# Patient Record
Sex: FEMALE | Race: OTHER | Employment: FULL TIME | URBAN - METROPOLITAN AREA
[De-identification: names, ages, dates, MRNs, and addresses within clinical notes are randomized per-mention and may not be internally consistent; named-entity substitution may affect disease eponyms.]

---

## 2017-04-24 PROBLEM — Z85.3 HISTORY OF BREAST CANCER: Status: ACTIVE | Noted: 2017-04-24

## 2017-04-25 PROBLEM — D50.8 IRON DEFICIENCY ANEMIA SECONDARY TO INADEQUATE DIETARY IRON INTAKE: Status: RESOLVED | Noted: 2017-04-25 | Resolved: 2017-04-25

## 2017-04-25 PROBLEM — D50.8 IRON DEFICIENCY ANEMIA SECONDARY TO INADEQUATE DIETARY IRON INTAKE: Status: ACTIVE | Noted: 2017-04-25

## 2017-04-25 PROBLEM — D64.9 ANEMIA: Status: ACTIVE | Noted: 2017-04-25

## 2017-04-28 ENCOUNTER — HOSPITAL ENCOUNTER (OUTPATIENT)
Dept: CT IMAGING | Age: 42
Discharge: HOME | End: 2017-04-28
Attending: FAMILY MEDICINE
Payer: COMMERCIAL

## 2017-04-28 DIAGNOSIS — G43.909 MIGRAINE WITHOUT STATUS MIGRAINOSUS, NOT INTRACTABLE, UNSPECIFIED MIGRAINE TYPE: ICD-10-CM

## 2017-04-28 DIAGNOSIS — R55 SYNCOPE, UNSPECIFIED SYNCOPE TYPE: ICD-10-CM

## 2017-04-28 PROCEDURE — 70450 CT HEAD/BRAIN W/O DYE: CPT

## 2018-08-31 ENCOUNTER — HOSPITAL ENCOUNTER (OUTPATIENT)
Dept: OCCUPATIONAL MEDICINE | Age: 43
Discharge: HOME | End: 2018-08-31

## 2018-08-31 DIAGNOSIS — Z00.8 HEALTH EXAMINATION IN POPULATION SURVEY: ICD-10-CM

## 2019-03-30 ENCOUNTER — HOSPITAL ENCOUNTER (OUTPATIENT)
Dept: MAMMOGRAPHY | Age: 44
Discharge: HOME | End: 2019-03-30
Attending: OBSTETRICS & GYNECOLOGY
Payer: COMMERCIAL

## 2019-03-30 DIAGNOSIS — Z12.31 VISIT FOR SCREENING MAMMOGRAM: ICD-10-CM

## 2019-03-30 PROCEDURE — 77067 SCR MAMMO BI INCL CAD: CPT

## 2019-05-08 ENCOUNTER — HOSPITAL ENCOUNTER (OUTPATIENT)
Dept: MRI IMAGING | Age: 44
Discharge: HOME | End: 2019-05-08
Attending: OBSTETRICS & GYNECOLOGY
Payer: COMMERCIAL

## 2019-05-08 DIAGNOSIS — R92.2 DENSE BREAST TISSUE: ICD-10-CM

## 2019-05-08 DIAGNOSIS — Z85.3 HISTORY OF BREAST CANCER: ICD-10-CM

## 2019-05-08 PROCEDURE — A9575 INJ GADOTERATE MEGLUMI 0.1ML: HCPCS | Performed by: OBSTETRICS & GYNECOLOGY

## 2019-05-08 PROCEDURE — 77049 MRI BREAST C-+ W/CAD BI: CPT

## 2019-05-08 PROCEDURE — 74011250636 HC RX REV CODE- 250/636: Performed by: OBSTETRICS & GYNECOLOGY

## 2019-05-08 PROCEDURE — 74011000258 HC RX REV CODE- 258: Performed by: OBSTETRICS & GYNECOLOGY

## 2019-05-08 RX ORDER — GADOTERATE MEGLUMINE 376.9 MG/ML
10 INJECTION INTRAVENOUS
Status: COMPLETED | OUTPATIENT
Start: 2019-05-08 | End: 2019-05-08

## 2019-05-08 RX ORDER — SODIUM CHLORIDE 0.9 % (FLUSH) 0.9 %
10 SYRINGE (ML) INJECTION
Status: COMPLETED | OUTPATIENT
Start: 2019-05-08 | End: 2019-05-08

## 2019-05-08 RX ADMIN — Medication 10 ML: at 16:29

## 2019-05-08 RX ADMIN — GADOTERATE MEGLUMINE 10 ML: 376.9 INJECTION INTRAVENOUS at 16:29

## 2019-05-08 RX ADMIN — SODIUM CHLORIDE 100 ML: 900 INJECTION, SOLUTION INTRAVENOUS at 16:29

## 2019-07-30 PROBLEM — E55.9 VITAMIN D DEFICIENCY: Status: ACTIVE | Noted: 2019-07-30

## 2019-07-30 PROBLEM — E53.8 B12 DEFICIENCY: Status: ACTIVE | Noted: 2019-07-30

## 2019-08-15 ENCOUNTER — HOSPITAL ENCOUNTER (OUTPATIENT)
Dept: LAB | Age: 44
Discharge: HOME | End: 2019-08-15
Payer: COMMERCIAL

## 2019-08-15 DIAGNOSIS — D64.9 ANEMIA, UNSPECIFIED TYPE: ICD-10-CM

## 2019-08-15 LAB
ALBUMIN SERPL-MCNC: 4.2 G/DL (ref 3.5–5)
ALBUMIN/GLOB SERPL: 1 {RATIO} (ref 1.2–3.5)
ALP SERPL-CCNC: 49 U/L (ref 50–136)
ALT SERPL-CCNC: 21 U/L (ref 12–65)
ANION GAP SERPL CALC-SCNC: 4 MMOL/L (ref 7–16)
AST SERPL-CCNC: 11 U/L (ref 15–37)
BASOPHILS # BLD: 0.1 K/UL (ref 0–0.2)
BASOPHILS NFR BLD: 1 % (ref 0–2)
BILIRUB SERPL-MCNC: 0.5 MG/DL (ref 0.2–1.1)
BUN SERPL-MCNC: 9 MG/DL (ref 6–23)
CALCIUM SERPL-MCNC: 9.2 MG/DL (ref 8.3–10.4)
CHLORIDE SERPL-SCNC: 109 MMOL/L (ref 98–107)
CO2 SERPL-SCNC: 28 MMOL/L (ref 21–32)
CREAT SERPL-MCNC: 0.84 MG/DL (ref 0.6–1)
CRP SERPL-MCNC: <0.3 MG/DL (ref 0–0.9)
DIFFERENTIAL METHOD BLD: ABNORMAL
EOSINOPHIL # BLD: 0.1 K/UL (ref 0–0.8)
EOSINOPHIL NFR BLD: 1 % (ref 0.5–7.8)
ERYTHROCYTE [DISTWIDTH] IN BLOOD BY AUTOMATED COUNT: 34.1 % (ref 11.9–14.6)
ERYTHROCYTE [SEDIMENTATION RATE] IN BLOOD: 13 MM/HR (ref 0–20)
FERRITIN SERPL-MCNC: 16 NG/ML (ref 8–388)
FOLATE SERPL-MCNC: 18.9 NG/ML (ref 3.1–17.5)
GLOBULIN SER CALC-MCNC: 4.1 G/DL (ref 2.3–3.5)
GLUCOSE SERPL-MCNC: 69 MG/DL (ref 65–100)
HAPTOGLOB SERPL-MCNC: 124 MG/DL (ref 30–200)
HCT VFR BLD AUTO: 34.9 % (ref 35.8–46.3)
HGB BLD-MCNC: 9.2 G/DL (ref 11.7–15.4)
HGB RETIC QN AUTO: 26 PG (ref 29–35)
IMM GRANULOCYTES # BLD AUTO: 0 K/UL (ref 0–0.5)
IMM GRANULOCYTES NFR BLD AUTO: 0 % (ref 0–5)
IMM RETICS NFR: 22 % (ref 3–15.9)
IRON SATN MFR SERPL: 5 %
IRON SERPL-MCNC: 23 UG/DL (ref 35–150)
LDH SERPL L TO P-CCNC: 129 U/L (ref 100–190)
LYMPHOCYTES # BLD: 1.1 K/UL (ref 0.5–4.6)
LYMPHOCYTES NFR BLD: 22 % (ref 13–44)
MCH RBC QN AUTO: 19.3 PG (ref 26.1–32.9)
MCHC RBC AUTO-ENTMCNC: 26.4 G/DL (ref 31.4–35)
MCV RBC AUTO: 73.2 FL (ref 79.6–97.8)
MONOCYTES # BLD: 0.4 K/UL (ref 0.1–1.3)
MONOCYTES NFR BLD: 8 % (ref 4–12)
NEUTS SEG # BLD: 3.3 K/UL (ref 1.7–8.2)
NEUTS SEG NFR BLD: 68 % (ref 43–78)
NRBC # BLD: 0 K/UL (ref 0–0.2)
PLATELET # BLD AUTO: 311 K/UL (ref 150–450)
PLATELET COMMENTS,PCOM: ADEQUATE
PMV BLD AUTO: ABNORMAL FL (ref 9.4–12.3)
POTASSIUM SERPL-SCNC: 4.3 MMOL/L (ref 3.5–5.1)
PROT SERPL-MCNC: 8.3 G/DL (ref 6.3–8.2)
RBC # BLD AUTO: 4.77 M/UL (ref 4.05–5.25)
RBC MORPH BLD: ABNORMAL
RETICS # AUTO: 0.16 M/UL (ref 0.03–0.1)
RETICS/RBC NFR AUTO: 3.3 % (ref 0.3–2)
SODIUM SERPL-SCNC: 141 MMOL/L (ref 136–145)
TIBC SERPL-MCNC: 435 UG/DL (ref 250–450)
VIT B12 SERPL-MCNC: 261 PG/ML (ref 193–986)
WBC # BLD AUTO: 5 K/UL (ref 4.3–11.1)
WBC MORPH BLD: ABNORMAL

## 2019-08-15 PROCEDURE — 85025 COMPLETE CBC W/AUTO DIFF WBC: CPT

## 2019-08-15 PROCEDURE — 83010 ASSAY OF HAPTOGLOBIN QUANT: CPT

## 2019-08-15 PROCEDURE — 85046 RETICYTE/HGB CONCENTRATE: CPT

## 2019-08-15 PROCEDURE — 83615 LACTATE (LD) (LDH) ENZYME: CPT

## 2019-08-15 PROCEDURE — 82607 VITAMIN B-12: CPT

## 2019-08-15 PROCEDURE — 80053 COMPREHEN METABOLIC PANEL: CPT

## 2019-08-15 PROCEDURE — 82728 ASSAY OF FERRITIN: CPT

## 2019-08-15 PROCEDURE — 83540 ASSAY OF IRON: CPT

## 2019-08-15 PROCEDURE — 85652 RBC SED RATE AUTOMATED: CPT

## 2019-08-15 PROCEDURE — 82746 ASSAY OF FOLIC ACID SERUM: CPT

## 2019-08-15 PROCEDURE — 86140 C-REACTIVE PROTEIN: CPT

## 2019-08-16 LAB — PATH REV BLD -IMP: NORMAL

## 2020-05-26 PROBLEM — N92.0 MENORRHAGIA WITH REGULAR CYCLE: Status: ACTIVE | Noted: 2020-05-26

## 2020-06-08 ENCOUNTER — HOSPITAL ENCOUNTER (OUTPATIENT)
Dept: LAB | Age: 45
Discharge: HOME | End: 2020-06-08
Payer: COMMERCIAL

## 2020-06-08 DIAGNOSIS — E53.8 B12 DEFICIENCY: ICD-10-CM

## 2020-06-08 DIAGNOSIS — D64.9 ANEMIA, UNSPECIFIED TYPE: ICD-10-CM

## 2020-06-08 LAB
ALBUMIN SERPL-MCNC: 4.1 G/DL (ref 3.5–5)
ALBUMIN/GLOB SERPL: 1.1 {RATIO} (ref 1.2–3.5)
ALP SERPL-CCNC: 59 U/L (ref 50–136)
ALT SERPL-CCNC: 18 U/L (ref 12–65)
ANION GAP SERPL CALC-SCNC: 5 MMOL/L (ref 7–16)
AST SERPL-CCNC: 7 U/L (ref 15–37)
BASOPHILS # BLD: 0.1 K/UL (ref 0–0.2)
BASOPHILS NFR BLD: 1 % (ref 0–2)
BILIRUB SERPL-MCNC: 0.2 MG/DL (ref 0.2–1.1)
BUN SERPL-MCNC: 13 MG/DL (ref 6–23)
CALCIUM SERPL-MCNC: 8.5 MG/DL (ref 8.3–10.4)
CHLORIDE SERPL-SCNC: 108 MMOL/L (ref 98–107)
CO2 SERPL-SCNC: 24 MMOL/L (ref 21–32)
CREAT SERPL-MCNC: 1.1 MG/DL (ref 0.6–1)
DIFFERENTIAL METHOD BLD: ABNORMAL
EOSINOPHIL # BLD: 0.1 K/UL (ref 0–0.8)
EOSINOPHIL NFR BLD: 1 % (ref 0.5–7.8)
ERYTHROCYTE [DISTWIDTH] IN BLOOD BY AUTOMATED COUNT: 18.6 % (ref 11.9–14.6)
FERRITIN SERPL-MCNC: 3 NG/ML (ref 8–388)
GLOBULIN SER CALC-MCNC: 3.9 G/DL (ref 2.3–3.5)
GLUCOSE SERPL-MCNC: 114 MG/DL (ref 65–100)
HCT VFR BLD AUTO: 27.2 % (ref 35.8–46.3)
HGB BLD-MCNC: 7.6 G/DL (ref 11.7–15.4)
HGB RETIC QN AUTO: 17 PG (ref 29–35)
IMM GRANULOCYTES # BLD AUTO: 0.1 K/UL (ref 0–0.5)
IMM GRANULOCYTES NFR BLD AUTO: 1 % (ref 0–5)
IMM RETICS NFR: 15.6 % (ref 3–15.9)
IRON SATN MFR SERPL: 2 %
IRON SERPL-MCNC: 9 UG/DL (ref 35–150)
LYMPHOCYTES # BLD: 1.5 K/UL (ref 0.5–4.6)
LYMPHOCYTES NFR BLD: 26 % (ref 13–44)
MCH RBC QN AUTO: 19.3 PG (ref 26.1–32.9)
MCHC RBC AUTO-ENTMCNC: 27.9 G/DL (ref 31.4–35)
MCV RBC AUTO: 69 FL (ref 79.6–97.8)
MONOCYTES # BLD: 0.3 K/UL (ref 0.1–1.3)
MONOCYTES NFR BLD: 6 % (ref 4–12)
NEUTS SEG # BLD: 3.5 K/UL (ref 1.7–8.2)
NEUTS SEG NFR BLD: 65 % (ref 43–78)
NRBC # BLD: 0 K/UL (ref 0–0.2)
PLATELET # BLD AUTO: 361 K/UL (ref 150–450)
PLATELET COMMENTS,PCOM: ADEQUATE
PMV BLD AUTO: 11.8 FL (ref 9.4–12.3)
POTASSIUM SERPL-SCNC: 3.9 MMOL/L (ref 3.5–5.1)
PROT SERPL-MCNC: 8 G/DL (ref 6.3–8.2)
RBC # BLD AUTO: 3.94 M/UL (ref 4.05–5.25)
RBC MORPH BLD: ABNORMAL
RETICS # AUTO: 0.05 M/UL (ref 0.03–0.1)
RETICS/RBC NFR AUTO: 1.3 % (ref 0.3–2)
SODIUM SERPL-SCNC: 137 MMOL/L (ref 136–145)
TIBC SERPL-MCNC: 430 UG/DL (ref 250–450)
VIT B12 SERPL-MCNC: 225 PG/ML (ref 193–986)
WBC # BLD AUTO: 5.6 K/UL (ref 4.3–11.1)
WBC MORPH BLD: ABNORMAL

## 2020-06-08 PROCEDURE — 82607 VITAMIN B-12: CPT

## 2020-06-08 PROCEDURE — 80053 COMPREHEN METABOLIC PANEL: CPT

## 2020-06-08 PROCEDURE — 83540 ASSAY OF IRON: CPT

## 2020-06-08 PROCEDURE — 85046 RETICYTE/HGB CONCENTRATE: CPT

## 2020-06-08 PROCEDURE — 82728 ASSAY OF FERRITIN: CPT

## 2020-06-08 PROCEDURE — 36415 COLL VENOUS BLD VENIPUNCTURE: CPT

## 2020-06-08 PROCEDURE — 85025 COMPLETE CBC W/AUTO DIFF WBC: CPT

## 2020-06-10 PROBLEM — R53.83 FATIGUE: Status: ACTIVE | Noted: 2020-06-10

## 2020-06-10 NOTE — H&P
Gynecology Major Surgery History and Physical    Denilson Orosco  [id number]    Subjective:      Chief complaint:  abnormal uterine bleeding    Pili is a 54 y.o.  female with a history of abnormal uterine bleeding, acute blood loss anemia, fibroids and menorrhagia. Previous treatment measures include hormone therapy and observation. Ultrasound findings include fibroids. She is admitted for Procedure(s) (LRB):  ERAS/ HYSTERECTOMY ABDOMINAL TOTAL WITH BILATERAL SALPINGECTOMY (N/A)  US 3/6/20  [id number] 9 vol 566 stripe 1.27 multiple fibroids can palpate one on right at umbilicus3.3 3.1 2.6 3.3  And 2.1 ovaries normal    Past Medical History:   Diagnosis Date    Acid reflux     Anemia 2017    B12 deficiency 2019    Breast cancer ([facility] [address] 2004    left breast (ductal carcinoma in situ with high-grade cell type).  Enlarged uterus 2016    Fibroids 2016    Multiple.  History of breast cancer 2017    Iron deficiency anemia secondary to inadequate dietary iron intake 2017    Vaginosis     Vitamin D deficiency 2019     Past Surgical History:   Procedure Laterality Date    HX  SECTION      x3.    HX MASTECTOMY Left     with TRAM flap reconstruction and abdominoplasty. OB History        3    Para   3    Term   3            AB        Living   3       SAB        TAB        Ectopic        Molar        Multiple        Live Births   3                No Known Allergies    Cannot display prior to admission medications because the patient has not been admitted in this contact.        Family History   Problem Relation Age of Onset    Hypertension Father     No Known Problems Mother     No Known Problems Maternal Grandmother     No Known Problems Maternal Grandfather     No Known Problems Paternal Grandmother     No Known Problems Paternal Grandfather      Social History     Socioeconomic History    Marital status:      Spouse name: Not on file    Number of children: Not on file    Years of education: Not on file    Highest education level: Not on file   Occupational History    Not on file   Social Needs    Financial resource strain: Not on file    Food insecurity     Worry: Not on file     Inability: Not on file    Transportation needs     Medical: Not on file     Non-medical: Not on file   Tobacco Use    Smoking status: Never Smoker    Smokeless tobacco: Never Used   Substance and Sexual Activity    Alcohol use: Yes     Comment: Occ    Drug use: No    Sexual activity: Yes     Partners: Male     Birth control/protection: Pill   Lifestyle    Physical activity     Days per week: Not on file     Minutes per session: Not on file    Stress: Not on file   Relationships    Social connections     Talks on phone: Not on file     Gets together: Not on file     Attends Holiness service: Not on file     Active member of club or organization: Not on file     Attends meetings of clubs or organizations: Not on file     Relationship status: Not on file    Intimate partner violence     Fear of current or ex partner: Not on file     Emotionally abused: Not on file     Physically abused: Not on file     Forced sexual activity: Not on file   Other Topics Concern    Not on file   Social History Narrative    Not on file         Review of Systems:  Pertinent items are noted in HPI. Objective: There were no vitals filed for this visit. Physical Exam:  General:  alert, cooperative, no distress, appears stated age   Lungs:  clear to auscultation bilaterally   Heart:  regular rate and rhythm, S1, S2 normal, no murmur, click, rub or gallop   Abdomen: soft, non-tender. Bowel sounds normal. No masses,  no organomegaly. palpation of uterine fibroids where flap was removed     Assessment:     abnormal uterine bleeding, fibroids with abnormal uterine bleeding or acute blood loss anemia   Pt is to have iron transfusion later this month with Amy Rincon    Plan:     1. Procedure(s) (LRB):  ERAS/ HYSTERECTOMY ABDOMINAL TOTAL WITH BILATERAL SALPINGECTOMY (N/A)  Discussed the risk of surgery including the risks of bleeding, infection, DVT, and surgical injuries to internal organs including but not limited to the bowels, bladder, rectum, and female reproductive organs. The patient understands the risks, any and all questions were answered to the patient's satisfaction.

## 2020-06-15 ENCOUNTER — HOSPITAL ENCOUNTER (OUTPATIENT)
Dept: INFUSION THERAPY | Age: 45
Discharge: HOME | End: 2020-06-15
Payer: COMMERCIAL

## 2020-06-15 VITALS
SYSTOLIC BLOOD PRESSURE: 106 MMHG | RESPIRATION RATE: 18 BRPM | BODY MASS INDEX: 24.24 KG/M2 | OXYGEN SATURATION: 100 % | HEART RATE: 76 BPM | WEIGHT: 120 LBS | DIASTOLIC BLOOD PRESSURE: 60 MMHG | TEMPERATURE: 97.8 F

## 2020-06-15 DIAGNOSIS — E53.8 B12 DEFICIENCY: ICD-10-CM

## 2020-06-15 DIAGNOSIS — D50.0 IRON DEFICIENCY ANEMIA DUE TO CHRONIC BLOOD LOSS: Primary | ICD-10-CM

## 2020-06-15 PROCEDURE — 96365 THER/PROPH/DIAG IV INF INIT: CPT

## 2020-06-15 PROCEDURE — 96361 HYDRATE IV INFUSION ADD-ON: CPT

## 2020-06-15 PROCEDURE — 74011250636 HC RX REV CODE- 250/636: Performed by: INTERNAL MEDICINE

## 2020-06-15 PROCEDURE — 96372 THER/PROPH/DIAG INJ SC/IM: CPT

## 2020-06-15 RX ORDER — CYANOCOBALAMIN 1000 UG/ML
1000 INJECTION, SOLUTION INTRAMUSCULAR; SUBCUTANEOUS ONCE
Status: COMPLETED | OUTPATIENT
Start: 2020-06-15 | End: 2020-06-15

## 2020-06-15 RX ORDER — SODIUM CHLORIDE 0.9 % (FLUSH) 0.9 %
10 SYRINGE (ML) INJECTION AS NEEDED
Status: DISCONTINUED | OUTPATIENT
Start: 2020-06-15 | End: 2020-06-16 | Stop reason: HOSPADM

## 2020-06-15 RX ADMIN — Medication 10 ML: at 15:40

## 2020-06-15 RX ADMIN — SODIUM CHLORIDE 750 MG: 900 INJECTION, SOLUTION INTRAVENOUS at 15:40

## 2020-06-15 RX ADMIN — SODIUM CHLORIDE 500 ML: 900 INJECTION, SOLUTION INTRAVENOUS at 16:00

## 2020-06-15 RX ADMIN — CYANOCOBALAMIN 1000 MCG: 1000 INJECTION, SOLUTION INTRAMUSCULAR; SUBCUTANEOUS at 15:49

## 2020-06-15 NOTE — PROGRESS NOTES
Arrived to the [facility]. Injectafer and Vitamin B12 injection completed. Patient tolerated well. Any issues or concerns during appointment: none. Patient aware of next infusion appointment on 6/22 (date) at 3:00 PM (time). Discharged ambulatory.

## 2020-06-22 ENCOUNTER — HOSPITAL ENCOUNTER (OUTPATIENT)
Dept: INFUSION THERAPY | Age: 45
Discharge: HOME | End: 2020-06-22
Payer: COMMERCIAL

## 2020-06-22 VITALS
HEART RATE: 79 BPM | SYSTOLIC BLOOD PRESSURE: 125 MMHG | OXYGEN SATURATION: 100 % | TEMPERATURE: 96.7 F | RESPIRATION RATE: 18 BRPM | DIASTOLIC BLOOD PRESSURE: 72 MMHG

## 2020-06-22 DIAGNOSIS — E53.8 B12 DEFICIENCY: ICD-10-CM

## 2020-06-22 DIAGNOSIS — D50.0 IRON DEFICIENCY ANEMIA DUE TO CHRONIC BLOOD LOSS: Primary | ICD-10-CM

## 2020-06-22 PROCEDURE — 96365 THER/PROPH/DIAG IV INF INIT: CPT

## 2020-06-22 PROCEDURE — 96372 THER/PROPH/DIAG INJ SC/IM: CPT

## 2020-06-22 PROCEDURE — 74011250636 HC RX REV CODE- 250/636: Performed by: INTERNAL MEDICINE

## 2020-06-22 RX ORDER — CYANOCOBALAMIN 1000 UG/ML
1000 INJECTION, SOLUTION INTRAMUSCULAR; SUBCUTANEOUS ONCE
Status: COMPLETED | OUTPATIENT
Start: 2020-06-22 | End: 2020-06-22

## 2020-06-22 RX ORDER — SODIUM CHLORIDE 0.9 % (FLUSH) 0.9 %
10 SYRINGE (ML) INJECTION AS NEEDED
Status: DISCONTINUED | OUTPATIENT
Start: 2020-06-22 | End: 2020-06-23 | Stop reason: HOSPADM

## 2020-06-22 RX ADMIN — CYANOCOBALAMIN 1000 MCG: 1000 INJECTION, SOLUTION INTRAMUSCULAR; SUBCUTANEOUS at 16:46

## 2020-06-22 RX ADMIN — Medication 10 ML: at 16:30

## 2020-06-22 RX ADMIN — SODIUM CHLORIDE 750 MG: 900 INJECTION, SOLUTION INTRAVENOUS at 16:35

## 2020-06-22 RX ADMIN — SODIUM CHLORIDE 500 ML: 900 INJECTION, SOLUTION INTRAVENOUS at 16:55

## 2020-06-22 NOTE — PROGRESS NOTES
Arrived to the [facility]. D8C1 Injectafer and B12 injection completed. Patient tolerated well. Any issues or concerns during appointment: NO.  Patient aware of next infusion appointment on 06/29/20 (date) at 0 (time). Discharged ambulatory.

## 2020-06-29 ENCOUNTER — HOSPITAL ENCOUNTER (OUTPATIENT)
Dept: INFUSION THERAPY | Age: 45
Discharge: HOME | End: 2020-06-29
Payer: COMMERCIAL

## 2020-06-29 VITALS
BODY MASS INDEX: 24.08 KG/M2 | TEMPERATURE: 98.9 F | DIASTOLIC BLOOD PRESSURE: 66 MMHG | OXYGEN SATURATION: 100 % | RESPIRATION RATE: 18 BRPM | WEIGHT: 119.2 LBS | HEART RATE: 80 BPM | SYSTOLIC BLOOD PRESSURE: 106 MMHG

## 2020-06-29 DIAGNOSIS — E53.8 B12 DEFICIENCY: ICD-10-CM

## 2020-06-29 DIAGNOSIS — D50.0 IRON DEFICIENCY ANEMIA DUE TO CHRONIC BLOOD LOSS: Primary | ICD-10-CM

## 2020-06-29 PROCEDURE — 96372 THER/PROPH/DIAG INJ SC/IM: CPT

## 2020-06-29 PROCEDURE — 74011250636 HC RX REV CODE- 250/636: Performed by: INTERNAL MEDICINE

## 2020-06-29 RX ORDER — CYANOCOBALAMIN 1000 UG/ML
1000 INJECTION, SOLUTION INTRAMUSCULAR; SUBCUTANEOUS ONCE
Status: COMPLETED | OUTPATIENT
Start: 2020-06-29 | End: 2020-06-29

## 2020-06-29 RX ADMIN — CYANOCOBALAMIN 1000 MCG: 1000 INJECTION, SOLUTION INTRAMUSCULAR; SUBCUTANEOUS at 14:10

## 2020-06-29 NOTE — PROGRESS NOTES
Arrived to the [facility]. Vitamin B12 completed. Provided education on Vitamin B12-patient has previously received this medication    Patient instructed to report any side affects to ordering provider-  Patient tolerated well  Any issues or concerns during appointment:Patient has \"darkened area\" of skin on left neck that she says has been present for about 6 months. Instructed patient to call PCP-verbalizes understanding  Patient aware of next infusion appointment on [facility] @ [id number]  Discharged home ambulatory

## 2020-07-02 ENCOUNTER — HOSPITAL ENCOUNTER (OUTPATIENT)
Dept: SURGERY | Age: 45
Discharge: HOME | End: 2020-07-02

## 2020-07-06 ENCOUNTER — HOSPITAL ENCOUNTER (OUTPATIENT)
Dept: LAB | Age: 45
Discharge: HOME | End: 2020-07-06
Payer: COMMERCIAL

## 2020-07-06 ENCOUNTER — HOSPITAL ENCOUNTER (OUTPATIENT)
Dept: INFUSION THERAPY | Age: 45
Discharge: HOME | End: 2020-07-06

## 2020-07-06 VITALS — HEIGHT: 59 IN | WEIGHT: 120 LBS | BODY MASS INDEX: 24.19 KG/M2

## 2020-07-06 DIAGNOSIS — E53.8 B12 DEFICIENCY: ICD-10-CM

## 2020-07-06 DIAGNOSIS — D50.0 IRON DEFICIENCY ANEMIA DUE TO CHRONIC BLOOD LOSS: ICD-10-CM

## 2020-07-06 LAB
ALBUMIN SERPL-MCNC: 4.4 G/DL (ref 3.5–5)
ALBUMIN/GLOB SERPL: 1.1 {RATIO} (ref 1.2–3.5)
ALP SERPL-CCNC: 71 U/L (ref 50–136)
ALT SERPL-CCNC: 18 U/L (ref 12–65)
ANION GAP SERPL CALC-SCNC: 4 MMOL/L (ref 7–16)
AST SERPL-CCNC: 13 U/L (ref 15–37)
BASOPHILS # BLD: 0 K/UL (ref 0–0.2)
BASOPHILS NFR BLD: 0 % (ref 0–2)
BILIRUB SERPL-MCNC: 0.4 MG/DL (ref 0.2–1.1)
BUN SERPL-MCNC: 12 MG/DL (ref 6–23)
CALCIUM SERPL-MCNC: 8.9 MG/DL (ref 8.3–10.4)
CHLORIDE SERPL-SCNC: 107 MMOL/L (ref 98–107)
CO2 SERPL-SCNC: 27 MMOL/L (ref 21–32)
CREAT SERPL-MCNC: 0.9 MG/DL (ref 0.6–1)
DIFFERENTIAL METHOD BLD: ABNORMAL
EOSINOPHIL # BLD: 0 K/UL (ref 0–0.8)
EOSINOPHIL NFR BLD: 1 % (ref 0.5–7.8)
FERRITIN SERPL-MCNC: 492 NG/ML (ref 8–388)
GLOBULIN SER CALC-MCNC: 4 G/DL (ref 2.3–3.5)
GLUCOSE SERPL-MCNC: 98 MG/DL (ref 65–100)
HCT VFR BLD AUTO: 39.7 % (ref 35.8–46.3)
HGB BLD-MCNC: 12 G/DL (ref 11.7–15.4)
IMM GRANULOCYTES # BLD AUTO: 0 K/UL (ref 0–0.5)
IMM GRANULOCYTES NFR BLD AUTO: 1 % (ref 0–5)
IRON SATN MFR SERPL: 56 %
IRON SERPL-MCNC: 171 UG/DL (ref 35–150)
LYMPHOCYTES # BLD: 1.2 K/UL (ref 0.5–4.6)
LYMPHOCYTES NFR BLD: 26 % (ref 13–44)
MCH RBC QN AUTO: 24.8 PG (ref 26.1–32.9)
MCHC RBC AUTO-ENTMCNC: 30.2 G/DL (ref 31.4–35)
MCV RBC AUTO: 82 FL (ref 79.6–97.8)
MONOCYTES # BLD: 0.2 K/UL (ref 0.1–1.3)
MONOCYTES NFR BLD: 5 % (ref 4–12)
NEUTS SEG # BLD: 3.4 K/UL (ref 1.7–8.2)
NEUTS SEG NFR BLD: 67 % (ref 43–78)
NRBC # BLD: 0 K/UL (ref 0–0.2)
PLATELET # BLD AUTO: 359 K/UL (ref 150–450)
PLATELET COMMENTS,PCOM: ADEQUATE
PMV BLD AUTO: 11.8 FL (ref 9.4–12.3)
POTASSIUM SERPL-SCNC: 4 MMOL/L (ref 3.5–5.1)
PROT SERPL-MCNC: 8.4 G/DL (ref 6.3–8.2)
RBC # BLD AUTO: 4.84 M/UL (ref 4.05–5.25)
RBC MORPH BLD: ABNORMAL
SODIUM SERPL-SCNC: 138 MMOL/L (ref 136–145)
TIBC SERPL-MCNC: 308 UG/DL (ref 250–450)
VIT B12 SERPL-MCNC: 501 PG/ML (ref 193–986)
WBC # BLD AUTO: 4.8 K/UL (ref 4.3–11.1)
WBC MORPH BLD: ABNORMAL

## 2020-07-06 PROCEDURE — 36415 COLL VENOUS BLD VENIPUNCTURE: CPT

## 2020-07-06 PROCEDURE — 80053 COMPREHEN METABOLIC PANEL: CPT

## 2020-07-06 PROCEDURE — 83540 ASSAY OF IRON: CPT

## 2020-07-06 PROCEDURE — 85025 COMPLETE CBC W/AUTO DIFF WBC: CPT

## 2020-07-06 PROCEDURE — 82607 VITAMIN B-12: CPT

## 2020-07-06 PROCEDURE — 82728 ASSAY OF FERRITIN: CPT

## 2020-07-06 NOTE — PERIOP NOTES
PLEASE CONTINUE TAKING ALL PRESCRIPTION MEDICATIONS UP TO THE DAY OF SURGERY UNLESS OTHERWISE DIRECTED BELOW. DISCONTINUE all vitamins and supplements 7 days prior to surgery. DISCONTINUE Non-Steriodal Anti-Inflammatory (NSAIDS) such as Advil and Aleve 5 days prior to surgery. Home Medications to take  the day of surgery   Pantoprazole (protonix)    Norethindrone (micronor)         Home Medications   to Hold   Naproxen (aleve)- hold for 5 days prior to surgery         Comments          *Visitor policy of 1 visitor per patient discussed. Please do not bring home medications with you on the day of surgery unless otherwise directed by your nurse. If you are instructed to bring home medications, please give them to your nurse as they will be administered by the nursing staff. If you have any questions, please call [facility] [phone] or [facility] [phone]. A copy of this note was provided to the patient for reference.

## 2020-07-06 NOTE — PERIOP NOTES
Enhanced Recovery After GYN Surgery: non-diabetic patients    Drink Ensure Enlive - one bottle twice daily for five days starting on 7/15/2020 and stopping on 7/19/2020. Do not drink any Ensure Enlive the day before surgery 7/20/2020. Ensure Enlive is the preferred formula over other Ensure formulas as it is the only one that contains CaHMB which helps maintain and rebuild muscle health. It is recommended that you continue drinking this for one month after surgery. The night before surgery 7/20/2020- drink 2 bottles of the Ensure Pre-Surgery drink. The morning of surgery 7/21/2020- drink one bottle of the Ensure Pre-Surgery drink while on your way to the hospital. Drink this over 5-10 minutes. Drink nothing else after drinking the pre-surgical drink the morning of surgery. Bring your patient handbook with you to the hospital.    Things to remember:    1. You will be given clear liquids to drink, advancing diet as tolerated    2. You will be up and moving around with assistance 2-4 hours after surgery. 3. You will be given regularly scheduled pain medications (NSAIDS, Tylenol, Gabapentin) with narcotics as needed. 4. You may be able to go home that night if the surgeon okays and you are up and eating and drinking. Otherwise, your discharge will be the following morning around lunch time. 5. Continue drinking Ensure Enlive for 5 days after surgery.

## 2020-07-06 NOTE — PERIOP NOTES
Patient verified name and . Order for consent found in EHR and matches case posting; patient verified. Type 2 surgery    Labs per surgeon: CBC, BMP  Labs per anesthesia protocol: no additional labs needed  EKG: not needed    Patient informed a Covid swab is required 7 days prior to surgery. The testing center is located at the [address], [place]. For questions or concerns the patient is advised to call [phone]. An appointment is required and the testing clinic is closed from 12- for lunch and on weekends. Appointment date/time NOT found in EHR- pt aware COVID team will be calling to make appt that is 1 week prior to surgery- pt verbalizes understanding. Patient informed of ERAS/GYN class on 2020 at 1600 at which time labs will be drawn. Patient will also receive all patient education and hospital approved surgical skin cleanser to use per hospital policy. Patient instructed to hold all vitamins 7 days prior to surgery and NSAIDS 5 days prior to surgery, patient verbalized understanding. Patient instructed to continue previous medications as prescribed prior to surgery and to take the following medications the day of surgery according to anesthesia guidelines with a small sip of water: protonix, norethindrone. Patient answered medical/surgical history questions at their best of ability. All prior to admission medications documented in [facility].

## 2020-07-09 ENCOUNTER — HOSPITAL ENCOUNTER (OUTPATIENT)
Dept: SURGERY | Age: 45
Discharge: HOME | End: 2020-07-09
Payer: COMMERCIAL

## 2020-07-09 LAB
ANION GAP SERPL CALC-SCNC: 6 MMOL/L (ref 7–16)
BASOPHILS # BLD: 0 K/UL (ref 0–0.2)
BASOPHILS NFR BLD: 0 % (ref 0–2)
BUN SERPL-MCNC: 8 MG/DL (ref 6–23)
CALCIUM SERPL-MCNC: 9 MG/DL (ref 8.3–10.4)
CHLORIDE SERPL-SCNC: 106 MMOL/L (ref 98–107)
CO2 SERPL-SCNC: 27 MMOL/L (ref 21–32)
CREAT SERPL-MCNC: 0.98 MG/DL (ref 0.6–1)
DIFFERENTIAL METHOD BLD: ABNORMAL
EOSINOPHIL # BLD: 0.1 K/UL (ref 0–0.8)
EOSINOPHIL NFR BLD: 1 % (ref 0.5–7.8)
GLUCOSE SERPL-MCNC: 114 MG/DL (ref 65–100)
HCT VFR BLD AUTO: 40.4 % (ref 35.8–46.3)
HGB BLD-MCNC: 12.1 G/DL (ref 11.7–15.4)
IMM GRANULOCYTES # BLD AUTO: 0 K/UL (ref 0–0.5)
IMM GRANULOCYTES NFR BLD AUTO: 0 % (ref 0–5)
LYMPHOCYTES # BLD: 1.7 K/UL (ref 0.5–4.6)
LYMPHOCYTES NFR BLD: 29 % (ref 13–44)
MCH RBC QN AUTO: 25.1 PG (ref 26.1–32.9)
MCHC RBC AUTO-ENTMCNC: 30 G/DL (ref 31.4–35)
MCV RBC AUTO: 83.8 FL (ref 79.6–97.8)
MONOCYTES # BLD: 0.4 K/UL (ref 0.1–1.3)
MONOCYTES NFR BLD: 6 % (ref 4–12)
NEUTS SEG # BLD: 3.7 K/UL (ref 1.7–8.2)
NEUTS SEG NFR BLD: 64 % (ref 43–78)
NRBC # BLD: 0 K/UL (ref 0–0.2)
PLATELET # BLD AUTO: 410 K/UL (ref 150–450)
PLATELET COMMENTS,PCOM: ADEQUATE
PMV BLD AUTO: 12 FL (ref 9.4–12.3)
POTASSIUM SERPL-SCNC: 3.6 MMOL/L (ref 3.5–5.1)
RBC # BLD AUTO: 4.82 M/UL (ref 4.05–5.2)
RBC MORPH BLD: ABNORMAL
SODIUM SERPL-SCNC: 139 MMOL/L (ref 136–145)
WBC # BLD AUTO: 5.9 K/UL (ref 4.3–11.1)
WBC MORPH BLD: ABNORMAL

## 2020-07-09 PROCEDURE — 85025 COMPLETE CBC W/AUTO DIFF WBC: CPT

## 2020-07-09 PROCEDURE — 36415 COLL VENOUS BLD VENIPUNCTURE: CPT

## 2020-07-09 PROCEDURE — 80048 BASIC METABOLIC PNL TOTAL CA: CPT

## 2020-07-09 NOTE — PROGRESS NOTES
Patient attended ERAS/ GYN surgery orientation class today. Detailed instruction book regarding GYN surgery was provided at start of class. Class content included pre-operative instructions for surgery in the week prior to and day before surgery. Packet including Hibiclens and printed instructions on bathing was provided to patient. Detailed and printed diet instruction and presurgical drinks were also given to patient  in accordance with ERAS protocol. Detailed information was given regarding arriving at the hospital and instructions for the patient's day of surgery. Discussed recovery from surgery, hospital stay, pain management, and discharge. Reviewed recovery at home including pelvic rest, driving and activity restrictions, issues requiring call to physician etc. Ha Jackson all questions in detail. Patient voices understanding of all.

## 2020-07-10 NOTE — PERIOP NOTES
Labs done 7/9/20 within [facility] protocols. Recent Results (from the past 24 hour(s))   CBC WITH AUTOMATED DIFF    Collection Time: 07/09/20  3:57 PM   Result Value Ref Range    WBC 5.9 4.3 - 11.1 K/uL    RBC 4.82 4.05 - 5.2 M/uL    HGB 12.1 11.7 - 15.4 g/dL    HCT 40.4 35.8 - 46.3 %    MCV 83.8 79.6 - 97.8 FL    MCH 25.1 (L) 26.1 - 32.9 PG    MCHC 30.0 (L) 31.4 - 35.0 g/dL    PLATELET 328 032 - 796 K/uL    MPV 12.0 9.4 - 12.3 FL    ABSOLUTE NRBC 0.00 0.0 - 0.2 K/uL    NEUTROPHILS 64 43 - 78 %    LYMPHOCYTES 29 13 - 44 %    MONOCYTES 6 4.0 - 12.0 %    EOSINOPHILS 1 0.5 - 7.8 %    BASOPHILS 0 0.0 - 2.0 %    IMMATURE GRANULOCYTES 0 0.0 - 5.0 %    ABS. NEUTROPHILS 3.7 1.7 - 8.2 K/UL    ABS. LYMPHOCYTES 1.7 0.5 - 4.6 K/UL    ABS. MONOCYTES 0.4 0.1 - 1.3 K/UL    ABS. EOSINOPHILS 0.1 0.0 - 0.8 K/UL    ABS. BASOPHILS 0.0 0.0 - 0.2 K/UL    ABS. IMM.  GRANS. 0.0 0.0 - 0.5 K/UL    RBC COMMENTS        Dimorphic RBCs  MARKED  ANISOCYTOSIS + POIKILOCYTOSIS      RBC COMMENTS SLIGHT  OVALOCYTES        RBC COMMENTS SLIGHT  TEARDROP CELLS        RBC COMMENTS SLIGHT  HYPOCHROMIA        RBC COMMENTS MODERATE  SCHISTOCYTES        WBC COMMENTS Result Confirmed By Smear      PLATELET COMMENTS ADEQUATE      DF AUTOMATED     METABOLIC PANEL, BASIC    Collection Time: 07/09/20  3:57 PM   Result Value Ref Range    Sodium 139 136 - 145 mmol/L    Potassium 3.6 3.5 - 5.1 mmol/L    Chloride 106 98 - 107 mmol/L    CO2 27 21 - 32 mmol/L    Anion gap 6 (L) 7 - 16 mmol/L    Glucose 114 (H) 65 - 100 mg/dL    BUN 8 6 - 23 MG/DL    Creatinine 0.98 0.6 - 1.0 MG/DL    GFR est AA >60 >60 ml/min/1.73m2    GFR est non-AA >60 >60 ml/min/1.73m2    Calcium 9.0 8.3 - 10.4 MG/DL

## 2020-07-13 ENCOUNTER — APPOINTMENT (OUTPATIENT)
Dept: INFUSION THERAPY | Age: 45
End: 2020-07-13

## 2020-07-20 ENCOUNTER — ANESTHESIA EVENT (OUTPATIENT)
Dept: SURGERY | Age: 45
DRG: 743 | End: 2020-07-20
Payer: COMMERCIAL

## 2020-07-20 ENCOUNTER — APPOINTMENT (OUTPATIENT)
Dept: INFUSION THERAPY | Age: 45
End: 2020-07-20

## 2020-07-21 ENCOUNTER — HOSPITAL ENCOUNTER (INPATIENT)
Age: 45
LOS: 2 days | Discharge: HOME | DRG: 743 | End: 2020-07-23
Attending: OBSTETRICS & GYNECOLOGY | Admitting: OBSTETRICS & GYNECOLOGY
Payer: COMMERCIAL

## 2020-07-21 ENCOUNTER — ANESTHESIA (OUTPATIENT)
Dept: SURGERY | Age: 45
DRG: 743 | End: 2020-07-21
Payer: COMMERCIAL

## 2020-07-21 DIAGNOSIS — N85.2 ENLARGED UTERUS: ICD-10-CM

## 2020-07-21 DIAGNOSIS — D25.1 FIBROIDS, INTRAMURAL: Primary | ICD-10-CM

## 2020-07-21 DIAGNOSIS — N92.0 MENORRHAGIA WITH REGULAR CYCLE: ICD-10-CM

## 2020-07-21 LAB
ABO + RH BLD: NORMAL
BLOOD GROUP ANTIBODIES SERPL: NORMAL
HCG UR QL: NEGATIVE
SPECIMEN EXP DATE BLD: NORMAL

## 2020-07-21 PROCEDURE — 86900 BLOOD TYPING SEROLOGIC ABO: CPT

## 2020-07-21 PROCEDURE — 77030018836 HC SOL IRR NACL ICUM -A: Performed by: OBSTETRICS & GYNECOLOGY

## 2020-07-21 PROCEDURE — 77030002966 HC SUT PDS J&J -A: Performed by: OBSTETRICS & GYNECOLOGY

## 2020-07-21 PROCEDURE — 74011250636 HC RX REV CODE- 250/636: Performed by: OBSTETRICS & GYNECOLOGY

## 2020-07-21 PROCEDURE — 36415 COLL VENOUS BLD VENIPUNCTURE: CPT

## 2020-07-21 PROCEDURE — 77030037088 HC TUBE ENDOTRACH ORAL NSL COVD-A: Performed by: ANESTHESIOLOGY

## 2020-07-21 PROCEDURE — 77030040361 HC SLV COMPR DVT MDII -B: Performed by: OBSTETRICS & GYNECOLOGY

## 2020-07-21 PROCEDURE — 88307 TISSUE EXAM BY PATHOLOGIST: CPT

## 2020-07-21 PROCEDURE — 77030003029 HC SUT VCRL J&J -B: Performed by: OBSTETRICS & GYNECOLOGY

## 2020-07-21 PROCEDURE — 77030040922 HC BLNKT HYPOTHRM STRY -A: Performed by: ANESTHESIOLOGY

## 2020-07-21 PROCEDURE — 76210000006 HC OR PH I REC 0.5 TO 1 HR: Performed by: OBSTETRICS & GYNECOLOGY

## 2020-07-21 PROCEDURE — 74011250636 HC RX REV CODE- 250/636: Performed by: ANESTHESIOLOGY

## 2020-07-21 PROCEDURE — 77030039425 HC BLD LARYNG TRULITE DISP TELE -A: Performed by: ANESTHESIOLOGY

## 2020-07-21 PROCEDURE — 74011250637 HC RX REV CODE- 250/637: Performed by: OBSTETRICS & GYNECOLOGY

## 2020-07-21 PROCEDURE — 77030002888 HC SUT CHRMC J&J -A: Performed by: OBSTETRICS & GYNECOLOGY

## 2020-07-21 PROCEDURE — 77030011266 HC ELECTRD BLD INSL COVD -A: Performed by: OBSTETRICS & GYNECOLOGY

## 2020-07-21 PROCEDURE — 74011250636 HC RX REV CODE- 250/636: Performed by: NURSE ANESTHETIST, CERTIFIED REGISTERED

## 2020-07-21 PROCEDURE — 77030003602 HC NDL NRV BLK BBMI -B: Performed by: ANESTHESIOLOGY

## 2020-07-21 PROCEDURE — 77030002933 HC SUT MCRYL J&J -A: Performed by: OBSTETRICS & GYNECOLOGY

## 2020-07-21 PROCEDURE — 77030031139 HC SUT VCRL2 J&J -A: Performed by: OBSTETRICS & GYNECOLOGY

## 2020-07-21 PROCEDURE — 0UT90ZZ RESECTION OF UTERUS, OPEN APPROACH: ICD-10-PCS | Performed by: OBSTETRICS & GYNECOLOGY

## 2020-07-21 PROCEDURE — 76010000171 HC OR TIME 2 TO 2.5 HR INTENSV-TIER 1: Performed by: OBSTETRICS & GYNECOLOGY

## 2020-07-21 PROCEDURE — 74011000250 HC RX REV CODE- 250: Performed by: ANESTHESIOLOGY

## 2020-07-21 PROCEDURE — 74011250637 HC RX REV CODE- 250/637: Performed by: ANESTHESIOLOGY

## 2020-07-21 PROCEDURE — 65270000029 HC RM PRIVATE

## 2020-07-21 PROCEDURE — 0DNW0ZZ RELEASE PERITONEUM, OPEN APPROACH: ICD-10-PCS | Performed by: OBSTETRICS & GYNECOLOGY

## 2020-07-21 PROCEDURE — 77030011278 HC ELECTRD LIG IMPT COVD -F: Performed by: OBSTETRICS & GYNECOLOGY

## 2020-07-21 PROCEDURE — 76060000035 HC ANESTHESIA 2 TO 2.5 HR: Performed by: OBSTETRICS & GYNECOLOGY

## 2020-07-21 PROCEDURE — 74011000250 HC RX REV CODE- 250: Performed by: NURSE ANESTHETIST, CERTIFIED REGISTERED

## 2020-07-21 PROCEDURE — 77030040830 HC CATH URETH FOL MDII -A: Performed by: OBSTETRICS & GYNECOLOGY

## 2020-07-21 PROCEDURE — 0UT70ZZ RESECTION OF BILATERAL FALLOPIAN TUBES, OPEN APPROACH: ICD-10-PCS | Performed by: OBSTETRICS & GYNECOLOGY

## 2020-07-21 PROCEDURE — 81025 URINE PREGNANCY TEST: CPT

## 2020-07-21 RX ORDER — HYDROMORPHONE HYDROCHLORIDE 1 MG/ML
1 INJECTION, SOLUTION INTRAMUSCULAR; INTRAVENOUS; SUBCUTANEOUS
Status: DISCONTINUED | OUTPATIENT
Start: 2020-07-21 | End: 2020-07-23 | Stop reason: HOSPADM

## 2020-07-21 RX ORDER — OXYCODONE HYDROCHLORIDE 5 MG/1
5 TABLET ORAL
Status: DISCONTINUED | OUTPATIENT
Start: 2020-07-21 | End: 2020-07-21 | Stop reason: HOSPADM

## 2020-07-21 RX ORDER — SODIUM CHLORIDE 9 MG/ML
INJECTION, SOLUTION INTRAVENOUS
Status: DISCONTINUED | OUTPATIENT
Start: 2020-07-21 | End: 2020-07-21 | Stop reason: HOSPADM

## 2020-07-21 RX ORDER — NALOXONE HYDROCHLORIDE 0.4 MG/ML
0.1 INJECTION, SOLUTION INTRAMUSCULAR; INTRAVENOUS; SUBCUTANEOUS
Status: DISCONTINUED | OUTPATIENT
Start: 2020-07-21 | End: 2020-07-21 | Stop reason: HOSPADM

## 2020-07-21 RX ORDER — ZOLPIDEM TARTRATE 5 MG/1
5 TABLET ORAL
Status: DISCONTINUED | OUTPATIENT
Start: 2020-07-21 | End: 2020-07-23 | Stop reason: HOSPADM

## 2020-07-21 RX ORDER — ONDANSETRON 2 MG/ML
INJECTION INTRAMUSCULAR; INTRAVENOUS AS NEEDED
Status: DISCONTINUED | OUTPATIENT
Start: 2020-07-21 | End: 2020-07-21 | Stop reason: HOSPADM

## 2020-07-21 RX ORDER — SODIUM CHLORIDE 0.9 % (FLUSH) 0.9 %
5-40 SYRINGE (ML) INJECTION AS NEEDED
Status: DISCONTINUED | OUTPATIENT
Start: 2020-07-21 | End: 2020-07-23 | Stop reason: HOSPADM

## 2020-07-21 RX ORDER — LIDOCAINE HYDROCHLORIDE 20 MG/ML
INJECTION, SOLUTION EPIDURAL; INFILTRATION; INTRACAUDAL; PERINEURAL AS NEEDED
Status: DISCONTINUED | OUTPATIENT
Start: 2020-07-21 | End: 2020-07-21 | Stop reason: HOSPADM

## 2020-07-21 RX ORDER — FLUMAZENIL 0.1 MG/ML
0.2 INJECTION INTRAVENOUS
Status: DISCONTINUED | OUTPATIENT
Start: 2020-07-21 | End: 2020-07-21 | Stop reason: HOSPADM

## 2020-07-21 RX ORDER — SODIUM CHLORIDE, SODIUM LACTATE, POTASSIUM CHLORIDE, CALCIUM CHLORIDE 600; 310; 30; 20 MG/100ML; MG/100ML; MG/100ML; MG/100ML
75 INJECTION, SOLUTION INTRAVENOUS CONTINUOUS
Status: DISCONTINUED | OUTPATIENT
Start: 2020-07-21 | End: 2020-07-21 | Stop reason: HOSPADM

## 2020-07-21 RX ORDER — KETAMINE HYDROCHLORIDE 10 MG/ML
INJECTION, SOLUTION INTRAMUSCULAR; INTRAVENOUS AS NEEDED
Status: DISCONTINUED | OUTPATIENT
Start: 2020-07-21 | End: 2020-07-21 | Stop reason: HOSPADM

## 2020-07-21 RX ORDER — KETOROLAC TROMETHAMINE 30 MG/ML
30 INJECTION, SOLUTION INTRAMUSCULAR; INTRAVENOUS EVERY 6 HOURS
Status: DISCONTINUED | OUTPATIENT
Start: 2020-07-21 | End: 2020-07-23 | Stop reason: HOSPADM

## 2020-07-21 RX ORDER — DEXAMETHASONE SODIUM PHOSPHATE 4 MG/ML
INJECTION, SOLUTION INTRA-ARTICULAR; INTRALESIONAL; INTRAMUSCULAR; INTRAVENOUS; SOFT TISSUE AS NEEDED
Status: DISCONTINUED | OUTPATIENT
Start: 2020-07-21 | End: 2020-07-21 | Stop reason: HOSPADM

## 2020-07-21 RX ORDER — DEXAMETHASONE SODIUM PHOSPHATE 4 MG/ML
INJECTION, SOLUTION INTRA-ARTICULAR; INTRALESIONAL; INTRAMUSCULAR; INTRAVENOUS; SOFT TISSUE
Status: COMPLETED | OUTPATIENT
Start: 2020-07-21 | End: 2020-07-21

## 2020-07-21 RX ORDER — BUPIVACAINE HYDROCHLORIDE AND EPINEPHRINE 2.5; 5 MG/ML; UG/ML
INJECTION, SOLUTION EPIDURAL; INFILTRATION; INTRACAUDAL; PERINEURAL
Status: COMPLETED | OUTPATIENT
Start: 2020-07-21 | End: 2020-07-21

## 2020-07-21 RX ORDER — HYDROMORPHONE HYDROCHLORIDE 2 MG/ML
0.5 INJECTION, SOLUTION INTRAMUSCULAR; INTRAVENOUS; SUBCUTANEOUS
Status: DISCONTINUED | OUTPATIENT
Start: 2020-07-21 | End: 2020-07-21 | Stop reason: HOSPADM

## 2020-07-21 RX ORDER — NEOSTIGMINE METHYLSULFATE 1 MG/ML
INJECTION, SOLUTION INTRAVENOUS AS NEEDED
Status: DISCONTINUED | OUTPATIENT
Start: 2020-07-21 | End: 2020-07-21 | Stop reason: HOSPADM

## 2020-07-21 RX ORDER — ACETAMINOPHEN 325 MG/1
650 TABLET ORAL
Status: DISCONTINUED | OUTPATIENT
Start: 2020-07-21 | End: 2020-07-23 | Stop reason: HOSPADM

## 2020-07-21 RX ORDER — SODIUM CHLORIDE 0.9 % (FLUSH) 0.9 %
5-40 SYRINGE (ML) INJECTION EVERY 8 HOURS
Status: DISCONTINUED | OUTPATIENT
Start: 2020-07-21 | End: 2020-07-23 | Stop reason: HOSPADM

## 2020-07-21 RX ORDER — MIDAZOLAM HYDROCHLORIDE 1 MG/ML
2 INJECTION, SOLUTION INTRAMUSCULAR; INTRAVENOUS
Status: COMPLETED | OUTPATIENT
Start: 2020-07-21 | End: 2020-07-21

## 2020-07-21 RX ORDER — SODIUM CHLORIDE, SODIUM LACTATE, POTASSIUM CHLORIDE, CALCIUM CHLORIDE 600; 310; 30; 20 MG/100ML; MG/100ML; MG/100ML; MG/100ML
100 INJECTION, SOLUTION INTRAVENOUS CONTINUOUS
Status: DISCONTINUED | OUTPATIENT
Start: 2020-07-21 | End: 2020-07-21 | Stop reason: HOSPADM

## 2020-07-21 RX ORDER — DIPHENHYDRAMINE HYDROCHLORIDE 50 MG/ML
12.5 INJECTION, SOLUTION INTRAMUSCULAR; INTRAVENOUS
Status: DISCONTINUED | OUTPATIENT
Start: 2020-07-21 | End: 2020-07-21 | Stop reason: HOSPADM

## 2020-07-21 RX ORDER — KETOROLAC TROMETHAMINE 30 MG/ML
INJECTION, SOLUTION INTRAMUSCULAR; INTRAVENOUS AS NEEDED
Status: DISCONTINUED | OUTPATIENT
Start: 2020-07-21 | End: 2020-07-21 | Stop reason: HOSPADM

## 2020-07-21 RX ORDER — GABAPENTIN 300 MG/1
600 CAPSULE ORAL ONCE
Status: COMPLETED | OUTPATIENT
Start: 2020-07-21 | End: 2020-07-21

## 2020-07-21 RX ORDER — CEFAZOLIN SODIUM/WATER 2 G/20 ML
2 SYRINGE (ML) INTRAVENOUS ONCE
Status: COMPLETED | OUTPATIENT
Start: 2020-07-21 | End: 2020-07-21

## 2020-07-21 RX ORDER — OXYCODONE AND ACETAMINOPHEN 7.5; 325 MG/1; MG/1
1 TABLET ORAL
Status: DISCONTINUED | OUTPATIENT
Start: 2020-07-21 | End: 2020-07-23 | Stop reason: HOSPADM

## 2020-07-21 RX ORDER — HALOPERIDOL 5 MG/ML
1 INJECTION INTRAMUSCULAR
Status: DISCONTINUED | OUTPATIENT
Start: 2020-07-21 | End: 2020-07-21 | Stop reason: HOSPADM

## 2020-07-21 RX ORDER — HYDROMORPHONE HYDROCHLORIDE 2 MG/ML
INJECTION, SOLUTION INTRAMUSCULAR; INTRAVENOUS; SUBCUTANEOUS AS NEEDED
Status: DISCONTINUED | OUTPATIENT
Start: 2020-07-21 | End: 2020-07-21 | Stop reason: HOSPADM

## 2020-07-21 RX ORDER — NALOXONE HYDROCHLORIDE 0.4 MG/ML
0.4 INJECTION, SOLUTION INTRAMUSCULAR; INTRAVENOUS; SUBCUTANEOUS AS NEEDED
Status: DISCONTINUED | OUTPATIENT
Start: 2020-07-21 | End: 2020-07-23 | Stop reason: HOSPADM

## 2020-07-21 RX ORDER — EPHEDRINE SULFATE/0.9% NACL/PF 50 MG/5 ML
SYRINGE (ML) INTRAVENOUS AS NEEDED
Status: DISCONTINUED | OUTPATIENT
Start: 2020-07-21 | End: 2020-07-21 | Stop reason: HOSPADM

## 2020-07-21 RX ORDER — ACETAMINOPHEN 500 MG
1000 TABLET ORAL ONCE
Status: COMPLETED | OUTPATIENT
Start: 2020-07-21 | End: 2020-07-21

## 2020-07-21 RX ORDER — HYOSCYAMINE SULFATE 0.12 MG/1
0.12 TABLET SUBLINGUAL
Status: DISCONTINUED | OUTPATIENT
Start: 2020-07-21 | End: 2020-07-23 | Stop reason: HOSPADM

## 2020-07-21 RX ORDER — LIDOCAINE HYDROCHLORIDE 10 MG/ML
0.1 INJECTION INFILTRATION; PERINEURAL AS NEEDED
Status: DISCONTINUED | OUTPATIENT
Start: 2020-07-21 | End: 2020-07-21 | Stop reason: HOSPADM

## 2020-07-21 RX ORDER — BUPIVACAINE HYDROCHLORIDE AND EPINEPHRINE 2.5; 5 MG/ML; UG/ML
INJECTION, SOLUTION EPIDURAL; INFILTRATION; INTRACAUDAL; PERINEURAL
Status: DISCONTINUED | OUTPATIENT
Start: 2020-07-21 | End: 2020-07-21

## 2020-07-21 RX ORDER — PROPOFOL 10 MG/ML
INJECTION, EMULSION INTRAVENOUS AS NEEDED
Status: DISCONTINUED | OUTPATIENT
Start: 2020-07-21 | End: 2020-07-21 | Stop reason: HOSPADM

## 2020-07-21 RX ORDER — DEXAMETHASONE SODIUM PHOSPHATE 4 MG/ML
INJECTION, SOLUTION INTRA-ARTICULAR; INTRALESIONAL; INTRAMUSCULAR; INTRAVENOUS; SOFT TISSUE
Status: DISCONTINUED | OUTPATIENT
Start: 2020-07-21 | End: 2020-07-21

## 2020-07-21 RX ORDER — GLYCOPYRROLATE 0.2 MG/ML
INJECTION INTRAMUSCULAR; INTRAVENOUS AS NEEDED
Status: DISCONTINUED | OUTPATIENT
Start: 2020-07-21 | End: 2020-07-21 | Stop reason: HOSPADM

## 2020-07-21 RX ORDER — ROCURONIUM BROMIDE 10 MG/ML
INJECTION, SOLUTION INTRAVENOUS AS NEEDED
Status: DISCONTINUED | OUTPATIENT
Start: 2020-07-21 | End: 2020-07-21 | Stop reason: HOSPADM

## 2020-07-21 RX ORDER — FENTANYL CITRATE 50 UG/ML
INJECTION, SOLUTION INTRAMUSCULAR; INTRAVENOUS AS NEEDED
Status: DISCONTINUED | OUTPATIENT
Start: 2020-07-21 | End: 2020-07-21 | Stop reason: HOSPADM

## 2020-07-21 RX ADMIN — KETOROLAC TROMETHAMINE 30 MG: 30 INJECTION, SOLUTION INTRAMUSCULAR; INTRAVENOUS at 09:04

## 2020-07-21 RX ADMIN — SODIUM CHLORIDE: 0.9 INJECTION, SOLUTION INTRAVENOUS at 07:28

## 2020-07-21 RX ADMIN — Medication 25 MG: at 07:31

## 2020-07-21 RX ADMIN — DEXAMETHASONE SODIUM PHOSPHATE 8 MG: 4 INJECTION, SOLUTION INTRAMUSCULAR; INTRAVENOUS at 07:59

## 2020-07-21 RX ADMIN — KETOROLAC TROMETHAMINE 30 MG: 30 INJECTION, SOLUTION INTRAMUSCULAR at 22:00

## 2020-07-21 RX ADMIN — Medication 10 ML: at 21:54

## 2020-07-21 RX ADMIN — DEXAMETHASONE SODIUM PHOSPHATE 2 MG: 4 INJECTION, SOLUTION INTRAMUSCULAR; INTRAVENOUS at 07:42

## 2020-07-21 RX ADMIN — GLYCOPYRROLATE 0.4 MG: 0.2 INJECTION, SOLUTION INTRAMUSCULAR; INTRAVENOUS at 09:21

## 2020-07-21 RX ADMIN — OXYCODONE HYDROCHLORIDE AND ACETAMINOPHEN 1 TABLET: 7.5; 325 TABLET ORAL at 22:13

## 2020-07-21 RX ADMIN — Medication 2 G: at 07:35

## 2020-07-21 RX ADMIN — SODIUM CHLORIDE: 0.9 INJECTION, SOLUTION INTRAVENOUS at 08:49

## 2020-07-21 RX ADMIN — ACETAMINOPHEN 1000 MG: 500 TABLET, FILM COATED ORAL at 06:12

## 2020-07-21 RX ADMIN — HYDROMORPHONE HYDROCHLORIDE 0.5 MG: 2 INJECTION INTRAMUSCULAR; INTRAVENOUS; SUBCUTANEOUS at 10:24

## 2020-07-21 RX ADMIN — Medication 10 MG: at 07:37

## 2020-07-21 RX ADMIN — OXYCODONE HYDROCHLORIDE AND ACETAMINOPHEN 1 TABLET: 7.5; 325 TABLET ORAL at 15:51

## 2020-07-21 RX ADMIN — Medication 3 MG: at 09:21

## 2020-07-21 RX ADMIN — LIDOCAINE HYDROCHLORIDE 0.1 ML: 10 INJECTION, SOLUTION INFILTRATION; PERINEURAL at 06:18

## 2020-07-21 RX ADMIN — ROCURONIUM BROMIDE 50 MG: 10 INJECTION, SOLUTION INTRAVENOUS at 07:31

## 2020-07-21 RX ADMIN — SODIUM CHLORIDE, SODIUM LACTATE, POTASSIUM CHLORIDE, AND CALCIUM CHLORIDE 75 ML/HR: 600; 310; 30; 20 INJECTION, SOLUTION INTRAVENOUS at 09:46

## 2020-07-21 RX ADMIN — GABAPENTIN 600 MG: 300 CAPSULE ORAL at 06:12

## 2020-07-21 RX ADMIN — PROPOFOL 200 MG: 10 INJECTION, EMULSION INTRAVENOUS at 07:31

## 2020-07-21 RX ADMIN — FENTANYL CITRATE 75 MCG: 50 INJECTION INTRAMUSCULAR; INTRAVENOUS at 07:31

## 2020-07-21 RX ADMIN — DEXAMETHASONE SODIUM PHOSPHATE 2 MG: 4 INJECTION, SOLUTION INTRAMUSCULAR; INTRAVENOUS at 07:39

## 2020-07-21 RX ADMIN — HYDROMORPHONE HYDROCHLORIDE 0.5 MG: 2 INJECTION INTRAMUSCULAR; INTRAVENOUS; SUBCUTANEOUS at 08:12

## 2020-07-21 RX ADMIN — ONDANSETRON 4 MG: 2 INJECTION INTRAMUSCULAR; INTRAVENOUS at 08:56

## 2020-07-21 RX ADMIN — SODIUM CHLORIDE, SODIUM LACTATE, POTASSIUM CHLORIDE, AND CALCIUM CHLORIDE 100 ML/HR: 600; 310; 30; 20 INJECTION, SOLUTION INTRAVENOUS at 06:24

## 2020-07-21 RX ADMIN — HYDROMORPHONE HYDROCHLORIDE 0.5 MG: 2 INJECTION INTRAMUSCULAR; INTRAVENOUS; SUBCUTANEOUS at 08:18

## 2020-07-21 RX ADMIN — KETOROLAC TROMETHAMINE 30 MG: 30 INJECTION, SOLUTION INTRAMUSCULAR at 14:27

## 2020-07-21 RX ADMIN — LIDOCAINE HYDROCHLORIDE 100 MG: 20 INJECTION, SOLUTION EPIDURAL; INFILTRATION; INTRACAUDAL; PERINEURAL at 07:31

## 2020-07-21 RX ADMIN — Medication 15 MG: at 08:30

## 2020-07-21 RX ADMIN — BUPIVACAINE HYDROCHLORIDE AND EPINEPHRINE BITARTRATE 20 ML: 2.5; .005 INJECTION, SOLUTION EPIDURAL; INFILTRATION; INTRACAUDAL; PERINEURAL at 07:42

## 2020-07-21 RX ADMIN — Medication 10 MG: at 07:35

## 2020-07-21 RX ADMIN — FENTANYL CITRATE 25 MCG: 50 INJECTION INTRAMUSCULAR; INTRAVENOUS at 07:58

## 2020-07-21 RX ADMIN — BUPIVACAINE HYDROCHLORIDE AND EPINEPHRINE BITARTRATE 20 ML: 2.5; .005 INJECTION, SOLUTION EPIDURAL; INFILTRATION; INTRACAUDAL; PERINEURAL at 07:39

## 2020-07-21 RX ADMIN — MIDAZOLAM 2 MG: 1 INJECTION INTRAMUSCULAR; INTRAVENOUS at 07:08

## 2020-07-21 RX ADMIN — Medication 10 ML: at 14:08

## 2020-07-21 NOTE — ANESTHESIA PROCEDURE NOTES
Peripheral Block    Start time: 7/21/2020 7:41 AM  End time: 7/21/2020 7:42 AM  Performed by: Melvin Tapia MD  Authorized by: Melvin Tapia MD       Pre-procedure:    Indications: at surgeon's request and post-op pain management    Preanesthetic Checklist: patient identified, risks and benefits discussed, site marked, timeout performed, anesthesia consent given and patient being monitored    Timeout Time: 07:40          Block Type:   Block Type:  TAP  Laterality:  Right  Monitoring:  Standard ASA monitoring, continuous pulse ox, frequent vital sign checks, heart rate and oxygen  Injection Technique:  Single shot  Procedures: ultrasound guided    Prep: chlorhexidine    Location:  Abdominal  Needle Type:  Stimuplex  Needle Gauge:  22 G  Needle Localization:  Ultrasound guidance    Assessment:  Number of attempts:  1  Injection Assessment:  Incremental injection every 5 mL, local visualized surrounding nerve on ultrasound, negative aspiration for blood, no paresthesia, no intravascular symptoms and ultrasound image on chart  Patient tolerance:  Patient tolerated the procedure well with no immediate complications

## 2020-07-21 NOTE — PROGRESS NOTES
Problem: Falls - Risk of  Goal: *Absence of Falls  Description: Document Alhaji Metcalf Fall Risk and appropriate interventions in the flowsheet.   Outcome: Progressing Towards Goal  Note: Fall Risk Interventions:

## 2020-07-21 NOTE — ANESTHESIA POSTPROCEDURE EVALUATION
Procedure(s):  ERAS/ HYSTERECTOMY ABDOMINAL TOTAL WITH BILATERAL SALPINGECTOMY.     general    Anesthesia Post Evaluation      Multimodal analgesia: multimodal analgesia used between 6 hours prior to anesthesia start to PACU discharge  Patient location during evaluation: PACU  Patient participation: complete - patient participated  Level of consciousness: awake  Pain management: adequate  Airway patency: patent  Anesthetic complications: no  Cardiovascular status: acceptable  Respiratory status: spontaneous ventilation and acceptable  Hydration status: acceptable  Post anesthesia nausea and vomiting:  none      INITIAL Post-op Vital signs:   Vitals Value Taken Time   /57 7/21/2020 10:16 AM   Temp 36.6 °C (97.8 °F) 7/21/2020  9:46 AM   Pulse 87 7/21/2020 10:16 AM   Resp 15 7/21/2020 10:16 AM   SpO2 98 % 7/21/2020 10:16 AM

## 2020-07-21 NOTE — ANESTHESIA PROCEDURE NOTES
Peripheral Block    Start time: 7/21/2020 7:41 AM  End time: 7/21/2020 7:43 AM  Performed by: Michael Washington MD  Authorized by: Michael Washington MD       Pre-procedure: Indications: at surgeon's request and post-op pain management    Preanesthetic Checklist: patient identified, risks and benefits discussed, site marked, timeout performed, anesthesia consent given and patient being monitored    Timeout Time: 07:40          Block Type:   Block Type:   Adductor canal  Laterality:  Right  Monitoring:  Standard ASA monitoring, continuous pulse ox, frequent vital sign checks, heart rate, responsive to questions and oxygen  Injection Technique:  Single shot  Procedures: ultrasound guided    Patient Position: supine  Prep: chlorhexidine    Location:  Mid thigh  Needle Type:  Stimuplex  Needle Gauge:  22 G  Needle Localization:  Ultrasound guidance    Assessment:  Number of attempts:  1  Injection Assessment:  Incremental injection every 5 mL, local visualized surrounding nerve on ultrasound, negative aspiration for CSF, negative aspiration for blood, no paresthesia, no intravascular symptoms and ultrasound image on chart  Patient tolerance:  Patient tolerated the procedure well with no immediate complications

## 2020-07-21 NOTE — ANESTHESIA PROCEDURE NOTES
Peripheral Block    Start time: 7/21/2020 7:37 AM  End time: 7/21/2020 7:39 AM  Performed by: Michael Washington MD  Authorized by: Michael Washington MD       Pre-procedure:    Indications: at surgeon's request and post-op pain management    Preanesthetic Checklist: patient identified, risks and benefits discussed, site marked, timeout performed, anesthesia consent given and patient being monitored    Timeout Time: 07:36          Block Type:   Block Type:  TAP  Laterality:  Left  Monitoring:  Standard ASA monitoring, continuous pulse ox, frequent vital sign checks, heart rate and oxygen  Injection Technique:  Single shot  Procedures: ultrasound guided    Prep: chlorhexidine    Location:  Abdominal  Needle Type:  Stimuplex  Needle Gauge:  22 G  Needle Localization:  Ultrasound guidance    Assessment:  Number of attempts:  1  Injection Assessment:  Incremental injection every 5 mL, local visualized surrounding nerve on ultrasound, negative aspiration for blood, no paresthesia, no intravascular symptoms and ultrasound image on chart  Patient tolerance:  Patient tolerated the procedure well with no immediate complications

## 2020-07-21 NOTE — PERIOP NOTES
TRANSFER - OUT REPORT:    Verbal report given to receiving nurse(name) on Khadar Paganns being transferred to [facility](unit) for routine progression of care       Report consisted of patient's Situation, Background, Assessment and   Recommendations(SBAR). Information from the following report(s) OR Summary, Procedure Summary, Intake/Output and MAR was reviewed with the receiving nurse. Opportunity for questions and clarification was provided.       Patient transported with:   [email or web address]

## 2020-07-21 NOTE — PROGRESS NOTES
TRANSFER - IN REPORT:    Verbal report received from Marie Rascon RN on Hannah Bynum  being received from PACU for routine post - op      Report consisted of patients Situation, Background, Assessment and   Recommendations(SBAR). Information from the following report(s) SBAR was reviewed with the receiving nurse. Opportunity for questions and clarification was provided. Assessment completed upon patients arrival to unit and care assumed.

## 2020-07-21 NOTE — BRIEF OP NOTE
Brief Postoperative Note  Dictation # 35. .. Cut out   Patient: Nadine Arriola  YOB: 1975  MRN: [id number]    Date of Procedure: 7/21/2020     Pre-Op Diagnosis: Iron deficiency anemia, unspecified iron deficiency anemia type [D50.9]  Enlarged uterus [N85.2]  Fibroids [D21.9]    Post-Op Diagnosis: Same as preoperative diagnosis.       Procedure(s):  ERAS/ HYSTERECTOMY ABDOMINAL TOTAL WITH BILATERAL SALPINGECTOMY    Surgeon(s):  DO Fei Campo MD    Surgical Assistant: None    Anesthesia: General     Estimated Blood Loss (mL): 211    Complications: None    Specimens:   ID Type Source Tests Collected by Time Destination   1 : uters, bilateral tubes Fresh   Chris Winslow DO 7/21/2020 0802 Pathology        Implants: * No implants in log *    Drains: * No LDAs found *    Findings: Enlarged fibroid uterus, absent rectus abdominus on right adhesions involving the fascia to the uterus at the fundus and adhesions on lower uterine segments bladder from selena csec    Electronically Signed by Victor Manuel Keith DO on 7/21/2020 at 9:59 AM

## 2020-07-21 NOTE — PROGRESS NOTES
07/21/20 1115   Dual Skin Pressure Injury Assessment   Dual Skin Pressure Injury Assessment WDL   Second Care Provider (Based on [address] [place] [address]) Lakesha Crowder RN    Dual skin assessment performed with Lakesha Crowder RN. Low transverse incision with dressing, CDI. Otherwise skin is intact, no open cuts, sores or wounds noted.

## 2020-07-21 NOTE — OP NOTES
[address] [address]  OPERATIVE REPORT    Name:  Radha West  MR#:  [id number]  :  1975  ACCOUNT #:  [de-identified]  DATE OF SERVICE:  2020    PREOPERATIVE DIAGNOSES:  Iron deficiency anemia secondary to menorrhagia, enlarged uterus and fibroids. POSTOPERATIVE DIAGNOSES:  Iron deficiency anemia secondary to menorrhagia, enlarged uterus and fibroids. PROCEDURES PERFORMED:  Total abdominal hysterectomy with bilateral salpingectomy and lysis of adhesions. SURGEON:  Edgard wEing DO    ASSISTANT:  Leighton Chamberlain MD    ANESTHESIA:  General with TAP block. COMPLICATIONS:  none. SPECIMENS REMOVED:  Uterus and bilateral tubes. IMPLANTS:  None. ESTIMATED BLOOD LOSS:  250 mL. URINE OUTPUT:  200 mL clear throughout the surgery. IV FLUIDS:  950 mL. DRAINS:  None. FINDINGS:  Enlarged fibroid and uterus with absent rectus abdominis on the right side, adhesions involving the fascia to the uterus of the fundus and adhesions to the lower uterine segment to the bladder from previous C-sections. DETAILS OF PROCEDURE:  After informed consent was obtained regarding risks, benefits, and alternatives to the procedure, the patient was taken to the operating room where she was prepped and draped in the normal sterile fashion. Her previous abdominoplasty scar was identified and an incision was made over this previous abdominoplasty scar until the underlying fascia was reached. Please note that Dr. Zach Mijares assistance was required for retraction and help with mobilizing the uterus and doing his side of the case. Once the fascia was identified, the fascia was entered sharply and dissected bluntly and sharply off the underlying rectus muscles on the left and the underlying peritoneum on the right.   Once this was done, the peritoneum was entered on the right side bluntly and the fascia was then further pulled away from the rectus muscles on the left side superiorly and inferiorly until adequate space was achieved. Once this was done, the peritoneal entry point was increased in size until the uterus could be delivered through this incision. Once this was done, it was grasped with a double-toothed tenaculum. A thorough inspection of the pelvis revealed no adhesions in the cul-de-sac and normal appendix, adhesions from the fascia to the fundus of the uterus and adhesions involving the bladder to the lower uterine segment. The dense adhesions were then taken down sharply with care to avoid bowel and bladder and this was just noted to be a significant scar tissue. Once this was done, the uterus was freed and the bladder flap was created sharply pushing the bladder inferiorly and anteriorly away from the plane of dissection. The uterine-ovarian pedicle bilaterally was coagulated and transected with the LigaSure device. The round ligament pedicle was coagulated and transected with the LigaSure device on each side. Dr. Anette Jasso assistance was required to do his side of the procedure. Once this was done, the bladder flap was then created and pushed even lower away from our plane of dissection. The remaining portion of the broad ligament was coagulated and transected with the LigaSure device bilaterally until the end of the broad ligament was reached. The uterine artery pedicles were identified and coagulated and transected with the LigaSure device bilaterally. Hemostasis was noted. There was some bleeding on the right side that was deemed hemostatic with suture and LigaSure device as the pedicle had pulled out from the LigaSure device. Once this was done, the end of the broad ligament was reached and curved R and N clamps were then used to come underneath the cervix and the uterus was amputated with the cervix and sent to Pathology along with the tubes that were previously coagulated and transected with the LigaSure device away from the mesosalpinx.   At this point, the cuff was closed with interrupted figure-of-eight sutures and an angled stitch at each side until the cuff was completely closed and no forceps could pass through it. A thorough irrigation of the pelvis revealed some bleeding on the bladder pillar pedicle to the left side of the cuff. This was deemed hemostatic with suture and this was noted to be away from the actual bladder proper. Again inspected, again there was no further bleeding. Thorough irrigation revealed no further bleeding, no further oozing. All pedicles were inspected again. The appendix was noted to be normal.  At this point, the peritoneum could not be closed. Therefore, the fascia was closed staring on the left side with a ribbon to recheck the bowel underneath to avoid injury to the bowel under direct visualization and the fascia on the right side was closed as well. At this point, the subcutaneous tissue was cleared of all clots and debris, irrigated, and found to be hemostatic. Subcutaneous tissue was closed with plain gut and subcuticular tissue was closed with 4-0 Monocryl on the skin. All lap, sponge, needle, and instrument counts were correct x2 and the patient was  taken to the recovery room in stable condition.       DO BRISSA Ambrose/[id number]  D:  07/21/2020 10:10  T:  07/21/2020 19:36  JOB #:  [id number]

## 2020-07-21 NOTE — ANESTHESIA PREPROCEDURE EVALUATION
Relevant Problems   No relevant active problems       Anesthetic History   No history of anesthetic complications            Review of Systems / Medical History  Patient summary reviewed and pertinent labs reviewed    Pulmonary  Within defined limits                 Neuro/Psych   Within defined limits           Cardiovascular  Within defined limits                Exercise tolerance: >4 METS  Comments: Denies recent CP, SOB or Palpitations   GI/Hepatic/Renal  Within defined limits              Endo/Other        Cancer (H/o Breast CA) and anemia (Iron Deficiency Anemia. Last Hgb/Hct 12/40)     Other Findings              Physical Exam    Airway  Mallampati: II  TM Distance: > 6 cm  Neck ROM: normal range of motion   Mouth opening: Normal     Cardiovascular  Regular rate and rhythm,  S1 and S2 normal,  no murmur, click, rub, or gallop             Dental  No notable dental hx       Pulmonary  Breath sounds clear to auscultation               Abdominal  GI exam deferred       Other Findings            Anesthetic Plan    ASA: 2  Anesthesia type: general          Induction: Intravenous  Anesthetic plan and risks discussed with: Patient and Spouse      ERAS patient. Plan for multimodal analgesia. Given preop Tylenol and Gabapentin. Plan for intraoperative Ketamine and TAP blocks after induction.

## 2020-07-21 NOTE — H&P
Breck Lombard, DO    Physician    Obstetrics & Gynecology    H&P    Signed    Date of Service:  06/10/20 8205                     []Hide copied text    []Shashi for details  Gynecology Major Surgery History and Physical     Davi Khan  [id number]   Since 6/10  There have been no changes in H and P except her Hg is higher after injectafer X 2  Subjective:      Chief complaint:  abnormal uterine bleeding     Radha Cam is a 54 y.o.  female with a history of abnormal uterine bleeding, acute blood loss anemia, fibroids and menorrhagia. Previous treatment measures include hormone therapy and observation. Ultrasound findings include fibroids. She is admitted for Procedure(s) (LRB):  ERAS/ HYSTERECTOMY ABDOMINAL TOTAL WITH BILATERAL SALPINGECTOMY (N/A)  US 3/6/20  [id number] 9 vol 566 stripe 1.27 multiple fibroids can palpate one on right at umbilicus3.3 3.1 2.6 3.3  And 2.1 ovaries normal          Past Medical History:   Diagnosis Date    Acid reflux      Anemia 2017    B12 deficiency 2019    Breast cancer ([address] 2004     left breast (ductal carcinoma in situ with high-grade cell type).  Enlarged uterus 2016    Fibroids 2016     Multiple.  History of breast cancer 2017    Iron deficiency anemia secondary to inadequate dietary iron intake 2017    Vaginosis      Vitamin D deficiency 2019           Past Surgical History:   Procedure Laterality Date    HX  SECTION         x3.  HX MASTECTOMY Left      with TRAM flap reconstruction and abdominoplasty.               OB History         3    Para   3    Term   3            AB        Living   3        SAB        TAB        Ectopic        Molar        Multiple        Live Births   3                  No Known Allergies     Cannot display prior to admission medications because the patient has not been admitted in this contact.               Family History   Problem Relation Age of Onset    Hypertension Father      No Known Problems Mother      No Known Problems Maternal Grandmother      No Known Problems Maternal Grandfather      No Known Problems Paternal Grandmother      No Known Problems Paternal Grandfather       Social History            Socioeconomic History    Marital status:        Spouse name: Not on file    Number of children: Not on file    Years of education: Not on file    Highest education level: Not on file   Occupational History    Not on file   Social Needs    Financial resource strain: Not on file    Food insecurity       Worry: Not on file       Inability: Not on file    Transportation needs       Medical: Not on file       Non-medical: Not on file   Tobacco Use    Smoking status: Never Smoker    Smokeless tobacco: Never Used   Substance and Sexual Activity    Alcohol use: Yes       Comment:  Occ    Drug use: No    Sexual activity: Yes       Partners: Male       Birth control/protection: Pill   Lifestyle    Physical activity       Days per week: Not on file       Minutes per session: Not on file    Stress: Not on file   Relationships    Social connections       Talks on phone: Not on file       Gets together: Not on file       Attends Orthodox service: Not on file       Active member of club or organization: Not on file       Attends meetings of clubs or organizations: Not on file       Relationship status: Not on file    Intimate partner violence       Fear of current or ex partner: Not on file       Emotionally abused: Not on file       Physically abused: Not on file       Forced sexual activity: Not on file   Other Topics Concern    Not on file   Social History Narrative    Not on file           Review of Systems:  Pertinent items are noted in HPI.      Objective:      There were no vitals filed for this visit.     Physical Exam:  General:  alert, cooperative, no distress, appears stated age   Lungs:  clear to auscultation bilaterally   Heart: regular rate and rhythm, S1, S2 normal, no murmur, click, rub or gallop   Abdomen: soft, non-tender. Bowel sounds normal. No masses,  no organomegaly. palpation of uterine fibroids where flap was removed      Assessment:      abnormal uterine bleeding, fibroids with abnormal uterine bleeding or acute blood loss anemia   Pt is to have iron transfusion later this month with Faustino Dobbs     Plan:      1. Procedure(s) (LRB):  ERAS/ HYSTERECTOMY ABDOMINAL TOTAL WITH BILATERAL SALPINGECTOMY (N/A)  Discussed the risk of surgery including the risks of bleeding, infection, DVT, and surgical injuries to internal organs including but not limited to the bowels, bladder, rectum, and female reproductive organs.  The patient understands the risks, any and all questions were answered to the patient's satisfaction.            Electronically signed by John Estes DO at 06/10/20 0811   Note Details     Author  John Estes DO  File Time  06/10/20 2013    Author Type  Physician  Status  Signed    Last   John Estes, [address] [place] [address] #  [de-identified]  Admit Date  7/21/2020        Admission (Current) on 7/21/2020          Detailed Report

## 2020-07-22 LAB
BASOPHILS # BLD: 0 K/UL (ref 0–0.2)
BASOPHILS NFR BLD: 0 % (ref 0–2)
DIFFERENTIAL METHOD BLD: ABNORMAL
EOSINOPHIL # BLD: 0 K/UL (ref 0–0.8)
EOSINOPHIL NFR BLD: 0 % (ref 0.5–7.8)
HCT VFR BLD AUTO: 37.8 % (ref 35.8–46.3)
HGB BLD-MCNC: 11.1 G/DL (ref 11.7–15.4)
IMM GRANULOCYTES # BLD AUTO: 0.1 K/UL (ref 0–0.5)
IMM GRANULOCYTES NFR BLD AUTO: 1 % (ref 0–5)
LYMPHOCYTES # BLD: 0.8 K/UL (ref 0.5–4.6)
LYMPHOCYTES NFR BLD: 7 % (ref 13–44)
MCH RBC QN AUTO: 27 PG (ref 26.1–32.9)
MCHC RBC AUTO-ENTMCNC: 29.4 G/DL (ref 31.4–35)
MCV RBC AUTO: 92 FL (ref 79.6–97.8)
MONOCYTES # BLD: 0.9 K/UL (ref 0.1–1.3)
MONOCYTES NFR BLD: 8 % (ref 4–12)
NEUTS SEG # BLD: 9.3 K/UL (ref 1.7–8.2)
NEUTS SEG NFR BLD: 84 % (ref 43–78)
NRBC # BLD: 0 K/UL (ref 0–0.2)
PLATELET # BLD AUTO: 152 K/UL (ref 150–450)
PLATELET COMMENTS,PCOM: ADEQUATE
PMV BLD AUTO: 12.3 FL (ref 9.4–12.3)
RBC # BLD AUTO: 4.11 M/UL (ref 4.05–5.2)
RBC MORPH BLD: ABNORMAL
WBC # BLD AUTO: 11.1 K/UL (ref 4.3–11.1)

## 2020-07-22 PROCEDURE — 85025 COMPLETE CBC W/AUTO DIFF WBC: CPT

## 2020-07-22 PROCEDURE — 65270000029 HC RM PRIVATE

## 2020-07-22 PROCEDURE — 36415 COLL VENOUS BLD VENIPUNCTURE: CPT

## 2020-07-22 PROCEDURE — 74011250637 HC RX REV CODE- 250/637: Performed by: OBSTETRICS & GYNECOLOGY

## 2020-07-22 PROCEDURE — 74011250636 HC RX REV CODE- 250/636: Performed by: OBSTETRICS & GYNECOLOGY

## 2020-07-22 RX ADMIN — OXYCODONE HYDROCHLORIDE AND ACETAMINOPHEN 1 TABLET: 7.5; 325 TABLET ORAL at 22:26

## 2020-07-22 RX ADMIN — KETOROLAC TROMETHAMINE 30 MG: 30 INJECTION, SOLUTION INTRAMUSCULAR at 04:08

## 2020-07-22 RX ADMIN — KETOROLAC TROMETHAMINE 30 MG: 30 INJECTION, SOLUTION INTRAMUSCULAR at 09:42

## 2020-07-22 RX ADMIN — OXYCODONE HYDROCHLORIDE AND ACETAMINOPHEN 1 TABLET: 7.5; 325 TABLET ORAL at 09:58

## 2020-07-22 RX ADMIN — Medication 10 ML: at 05:10

## 2020-07-22 RX ADMIN — Medication 5 ML: at 15:00

## 2020-07-22 RX ADMIN — OXYCODONE HYDROCHLORIDE AND ACETAMINOPHEN 1 TABLET: 7.5; 325 TABLET ORAL at 15:31

## 2020-07-22 NOTE — PROGRESS NOTES
END OF SHIFT NOTE:    Intake/Output  07/22 0701 - 07/22 1900  In: 473 [P.O.:473]  Out: 500 [Urine:500]   Voiding: YES  Catheter: NO  Drain:    Stool:  0 occurrences. Emesis:  0 occurrences. VITAL SIGNS  Patient Vitals for the past 12 hrs:   Temp Pulse Resp BP SpO2   07/22/20 1550 97.9 °F (36.6 °C) 72 17 102/54 100 %   07/22/20 1128 98.1 °F (36.7 °C) 77 17 105/62 100 %   07/22/20 0814 98.1 °F (36.7 °C) 61 17 105/53 100 %       Pain Assessment  Pain 1  Pain Scale 1: Visual (07/22/20 1602)  Pain Intensity 1: 0 (07/22/20 1602)  Patient Stated Pain Goal: 0 (07/22/20 1602)  Pain Reassessment 1: Patient resting w/respiratory rate greater than 10 (07/22/20 1602)  Pain Onset 1: post op (07/22/20 1532)  Pain Location 1: Abdomen;Back (07/22/20 1532)  Pain Orientation 1: Lower (07/22/20 1532)  Pain Description 1: Aching;Constant (07/22/20 1532)  Pain Intervention(s) 1: Medication (see MAR) (07/22/20 1532)    Ambulating  Yes    Additional Information:     Pts pain was controlled with PO PRN medication, Removed IV due to swelling and cool feeling, swelling in right  hand resolved once IV was removed. Physician stated plan is to d/c tomorrow. Shift report given to oncoming nurse at the bedside.     Georgia Schmidt

## 2020-07-22 NOTE — PROGRESS NOTES
Gynecology Progress Note    Patient doing well post-op day 1 from Procedure(s):  ERAS/ HYSTERECTOMY ABDOMINAL TOTAL WITH BILATERAL SALPINGECTOMY without significant complaints. Pain controlled on current medication. Voiding without difficulty. Patient is not passing flatus. RRrequsting another iv for iv toradol  Vitals:  Blood pressure 105/62, pulse 77, temperature 98.1 °F (36.7 °C), resp. rate 17, height 4' 11\" (1.499 m), weight 121 lb (54.9 kg), SpO2 100 %. Temp (24hrs), Av.1 °F (36.7 °C), Min:97.6 °F (36.4 °C), Max:98.4 °F (36.9 °C)        Exam:  Patient without distress. Abdomen soft,  nontender. Incision dry and clean without erythema. Lower extremities are negative for swelling, cords, or tenderness. Lab/Data Review:  CBC:   Lab Results   Component Value Date/Time    WBC 11.1 2020 04:09 AM    HGB 11.1 (L) 2020 04:09 AM    HCT 37.8 2020 04:09 AM     2020 04:09 AM       Assessment and Plan:  Patient appears to be having uncomplicated post Procedure(s):  ERAS/ HYSTERECTOMY ABDOMINAL TOTAL WITH BILATERAL SALPINGECTOMY course. Continue routine post-op care.  Arina Fuller in am

## 2020-07-22 NOTE — PROGRESS NOTES
Chart screened. Pt here for VIRIDIANA. Pt is , has [employer] for [employer], and has Md for follow up. Likely stay 1-2 nights. No needs iden.    Hakan Dawson

## 2020-07-22 NOTE — PROGRESS NOTES
Pt's hand swelled and was cold to the touch after flushing and toradol, attempted blood return, no blood return, took out line. @ [phone] Contacted Physician to find out plans for discharge and to ask if new line was needed.

## 2020-07-23 VITALS
SYSTOLIC BLOOD PRESSURE: 103 MMHG | RESPIRATION RATE: 18 BRPM | BODY MASS INDEX: 24.39 KG/M2 | HEIGHT: 59 IN | HEART RATE: 71 BPM | TEMPERATURE: 98.2 F | WEIGHT: 121 LBS | DIASTOLIC BLOOD PRESSURE: 65 MMHG | OXYGEN SATURATION: 98 %

## 2020-07-23 PROCEDURE — 74011250637 HC RX REV CODE- 250/637: Performed by: OBSTETRICS & GYNECOLOGY

## 2020-07-23 RX ORDER — IBUPROFEN 800 MG/1
800 TABLET ORAL
Qty: 30 TAB | Refills: 0 | Status: SHIPPED | OUTPATIENT
Start: 2020-07-23

## 2020-07-23 RX ORDER — OXYCODONE AND ACETAMINOPHEN 7.5; 325 MG/1; MG/1
1 TABLET ORAL
Qty: 20 TAB | Refills: 0 | Status: SHIPPED | OUTPATIENT
Start: 2020-07-23 | End: 2020-07-28

## 2020-07-23 RX ADMIN — OXYCODONE HYDROCHLORIDE AND ACETAMINOPHEN 1 TABLET: 7.5; 325 TABLET ORAL at 09:19

## 2020-07-23 RX ADMIN — OXYCODONE HYDROCHLORIDE AND ACETAMINOPHEN 1 TABLET: 7.5; 325 TABLET ORAL at 04:42

## 2020-07-23 NOTE — PROGRESS NOTES
Written Discharge instructions given and discussed with pt and spouse. Both verbalize and demonstrate understanding. Questions answered to both parties satisfaction.

## 2020-07-23 NOTE — PROGRESS NOTES
Pt discharged via wheelchair accompanied by ELVIRA Zabala. No complaints voiced. Denies pain. Spouse driving pt home.

## 2020-07-23 NOTE — PROGRESS NOTES
Resting in recliner after using BR, standby assist.  Pt given pillow to hold pressure to Abdomen when changing position. .  Pain assessment and medication given. See MAR and pain flowsheet.

## 2020-07-23 NOTE — DISCHARGE SUMMARY
Discharge Summary     Name: Clarice Lane MRN: [id number]  SSN: [id number]    YOB: 1975  Age: 54 y.o. Sex: female      Allergies: Patient has no known allergies. Admit Date: 7/21/2020    Discharge Date: 7/23/2020      Admitting Physician: Marvelyn Councilman, DO     * Admission Diagnoses: Fibroids and Menorrhagia    * Discharge Diagnoses:   Hospital Problems as of 7/23/2020 Date Reviewed: 7/22/2020          Codes Class Noted - Resolved POA    * (Principal) Fibroids, intramural ICD-10-CM: D25.1  ICD-9-CM: 218.1  7/21/2020 - Present Unknown               * Procedures: Total Abdominal Hysterectomy with Bilateral Salpingectomy    * Discharge Condition: [facility] Hospital Course: Normal hospital course for this procedure. Significant Diagnostic Studies: No results found for this or any previous visit (from the past 24 hour(s)). * Disposition: Home    Discharge Medications:   Current Discharge Medication List      START taking these medications    Details   oxyCODONE-acetaminophen (PERCOCET 7.5) 7.5-325 mg per tablet Take 1 Tab by mouth every six (6) hours as needed for Pain for up to 5 days. Max Daily Amount: 4 Tabs. Qty: 20 Tab, Refills: 0    Associated Diagnoses: Fibroids, intramural; Enlarged uterus; Menorrhagia with regular cycle      ibuprofen (MOTRIN) 800 mg tablet Take 1 Tab by mouth every eight (8) hours as needed for Pain. Qty: 30 Tab, Refills: 0         CONTINUE these medications which have NOT CHANGED    Details   ergocalciferol (VITAMIN D2) 1,250 mcg (50,000 unit) capsule Take 1 Cap by mouth every seven (7) days.   Qty: 12 Cap, Refills: 4    Associated Diagnoses: Vitamin D deficiency      pantoprazole (PROTONIX) 40 mg tablet Take daily 30 minute before meal- same meal every day  Qty: 30 Tab, Refills: 11    Associated Diagnoses: Gastritis without bleeding, unspecified chronicity, unspecified gastritis type      naproxen sodium (ALEVE) 220 mg tablet Take 220 mg by mouth two (2) times daily (with meals). cyanocobalamin (VITAMIN B12) 500 mcg tablet Take 1 Tab by mouth two (2) times a day. Qty: 90 Tab, Refills: 2    Associated Diagnoses: B12 deficiency      hyoscyamine SL (LEVSIN/SL) 0.125 mg SL tablet 1 Tab by SubLINGual route every six (6) hours as needed for Cramping. Qty: 60 Tab, Refills: 1    Associated Diagnoses: Menorrhagia with regular cycle         STOP taking these medications       norethindrone (MICRONOR) 0.35 mg tab Comments:   Reason for Stopping:                * Follow-up Care/Patient Instructions: Activity: No sex, douching, or tampons for 6 weeks or as directed by your physician. No heavy lifting for 6 weeks. No driving while taking pain medication. Diet: Resume pre-hospital diet  Wound Care: Keep wound clean and dry    Follow up at [facility], [address] [facility], [address] [place] [address], [phone].

## 2020-07-23 NOTE — PROGRESS NOTES
Patient is resting in bed. Assessment completed. Respirations are even and unlabored. No distress at this time. Bed is low, locked and call light within reach.

## 2020-09-02 ENCOUNTER — HOSPITAL ENCOUNTER (OUTPATIENT)
Dept: LAB | Age: 45
Discharge: HOME | End: 2020-09-02
Payer: COMMERCIAL

## 2020-09-02 DIAGNOSIS — E53.8 B12 DEFICIENCY: ICD-10-CM

## 2020-09-02 LAB
ALBUMIN SERPL-MCNC: 4.4 G/DL (ref 3.5–5)
ALBUMIN/GLOB SERPL: 1 {RATIO} (ref 1.2–3.5)
ALP SERPL-CCNC: 97 U/L (ref 50–136)
ALT SERPL-CCNC: 22 U/L (ref 12–65)
ANION GAP SERPL CALC-SCNC: 6 MMOL/L (ref 7–16)
AST SERPL-CCNC: 15 U/L (ref 15–37)
BASOPHILS # BLD: 0.1 K/UL (ref 0–0.2)
BASOPHILS NFR BLD: 1 % (ref 0–2)
BILIRUB SERPL-MCNC: 0.3 MG/DL (ref 0.2–1.1)
BUN SERPL-MCNC: 6 MG/DL (ref 6–23)
CALCIUM SERPL-MCNC: 9.4 MG/DL (ref 8.3–10.4)
CHLORIDE SERPL-SCNC: 106 MMOL/L (ref 98–107)
CO2 SERPL-SCNC: 25 MMOL/L (ref 21–32)
CREAT SERPL-MCNC: 0.8 MG/DL (ref 0.6–1)
DIFFERENTIAL METHOD BLD: NORMAL
EOSINOPHIL # BLD: 0.1 K/UL (ref 0–0.8)
EOSINOPHIL NFR BLD: 1 % (ref 0.5–7.8)
FERRITIN SERPL-MCNC: 148 NG/ML (ref 8–388)
FOLATE SERPL-MCNC: 12 NG/ML (ref 3.1–17.5)
GLOBULIN SER CALC-MCNC: 4.4 G/DL (ref 2.3–3.5)
GLUCOSE SERPL-MCNC: 84 MG/DL (ref 65–100)
HCT VFR BLD AUTO: 44.7 % (ref 35.8–46.3)
HGB BLD-MCNC: 14.6 G/DL (ref 11.7–15.4)
IMM GRANULOCYTES # BLD AUTO: 0 K/UL (ref 0–0.5)
IMM GRANULOCYTES NFR BLD AUTO: 0 % (ref 0–5)
IRON SATN MFR SERPL: 34 %
IRON SERPL-MCNC: 100 UG/DL (ref 35–150)
LYMPHOCYTES # BLD: 1.4 K/UL (ref 0.5–4.6)
LYMPHOCYTES NFR BLD: 23 % (ref 13–44)
MCH RBC QN AUTO: 29.9 PG (ref 26.1–32.9)
MCHC RBC AUTO-ENTMCNC: 32.7 G/DL (ref 31.4–35)
MCV RBC AUTO: 91.6 FL (ref 79.6–97.8)
MONOCYTES # BLD: 0.4 K/UL (ref 0.1–1.3)
MONOCYTES NFR BLD: 6 % (ref 4–12)
NEUTS SEG # BLD: 4.2 K/UL (ref 1.7–8.2)
NEUTS SEG NFR BLD: 69 % (ref 43–78)
NRBC # BLD: 0 K/UL (ref 0–0.2)
PLATELET # BLD AUTO: 273 K/UL (ref 150–450)
PLATELET COMMENTS,PCOM: ADEQUATE
PMV BLD AUTO: 10.8 FL (ref 9.4–12.3)
POTASSIUM SERPL-SCNC: 4 MMOL/L (ref 3.5–5.1)
PROT SERPL-MCNC: 8.8 G/DL (ref 6.3–8.2)
RBC # BLD AUTO: 4.88 M/UL (ref 4.05–5.25)
RBC MORPH BLD: NORMAL
SODIUM SERPL-SCNC: 137 MMOL/L (ref 136–145)
TIBC SERPL-MCNC: 292 UG/DL (ref 250–450)
VIT B12 SERPL-MCNC: 433 PG/ML (ref 193–986)
WBC # BLD AUTO: 6.2 K/UL (ref 4.3–11.1)
WBC MORPH BLD: NORMAL

## 2020-09-02 PROCEDURE — 36415 COLL VENOUS BLD VENIPUNCTURE: CPT

## 2020-09-02 PROCEDURE — 80053 COMPREHEN METABOLIC PANEL: CPT

## 2020-09-02 PROCEDURE — 83540 ASSAY OF IRON: CPT

## 2020-09-02 PROCEDURE — 82746 ASSAY OF FOLIC ACID SERUM: CPT

## 2020-09-02 PROCEDURE — 82728 ASSAY OF FERRITIN: CPT

## 2020-09-02 PROCEDURE — 85025 COMPLETE CBC W/AUTO DIFF WBC: CPT

## 2020-09-02 PROCEDURE — 82607 VITAMIN B-12: CPT

## 2020-12-17 ENCOUNTER — TRANSCRIBE ORDER (OUTPATIENT)
Dept: SCHEDULING | Age: 45
End: 2020-12-17

## 2020-12-18 ENCOUNTER — TRANSCRIBE ORDER (OUTPATIENT)
Dept: SCHEDULING | Age: 45
End: 2020-12-18

## 2020-12-18 DIAGNOSIS — Z12.31 BREAST CANCER SCREENING BY MAMMOGRAM: Primary | ICD-10-CM

## 2020-12-22 ENCOUNTER — TRANSCRIBE ORDER (OUTPATIENT)
Dept: SCHEDULING | Age: 45
End: 2020-12-22

## 2020-12-22 DIAGNOSIS — Z12.31 BREAST CANCER SCREENING BY MAMMOGRAM: Primary | ICD-10-CM

## 2021-11-22 ENCOUNTER — TRANSCRIBE ORDER (OUTPATIENT)
Dept: SCHEDULING | Age: 46
End: 2021-11-22

## 2021-11-22 DIAGNOSIS — Z78.0 MENOPAUSE: Primary | ICD-10-CM

## 2021-11-23 ENCOUNTER — TRANSCRIBE ORDER (OUTPATIENT)
Dept: SCHEDULING | Age: 46
End: 2021-11-23

## 2021-11-23 DIAGNOSIS — Z90.12 H/O TOTAL MASTECTOMY OF LEFT BREAST: ICD-10-CM

## 2021-11-23 DIAGNOSIS — Z85.3 HISTORY OF BREAST CANCER: Primary | ICD-10-CM

## (undated) DEVICE — DRAPE TWL SURG 16X26IN BLU ORB04] ALLCARE INC]

## (undated) DEVICE — SUTURE VCRL SZ 1 L36IN ABSRB UD L36MM CT-1 1/2 CIR J947H

## (undated) DEVICE — SUTURE VCRL SZ 4-0 L27IN ABSRB UD L19MM PS-2 3/8 CIR PRIM J426H

## (undated) DEVICE — TRAY PREP DRY W/ PREM GLV 2 APPL 6 SPNG 2 UNDPD 1 OVERWRAP

## (undated) DEVICE — SUTURE MCRYL SZ 4-0 L27IN ABSRB UD L19MM PS-2 1/2 CIR PRIM Y426H

## (undated) DEVICE — 2000CC GUARDIAN II: Brand: GUARDIAN

## (undated) DEVICE — CARDINAL HEALTH FLEXIBLE LIGHT HANDLE COVER: Brand: CARDINAL HEALTH

## (undated) DEVICE — CURVED, LARGE JAW, OPEN SEALER/DIVIDER NANO-COATED: Brand: LIGASURE IMPACT

## (undated) DEVICE — SUTURE PDS II SZ 0 L27IN ABSRB VLT L36MM CT-1 1/2 CIR Z340H

## (undated) DEVICE — SUT CHRMC 3-0 36IN CT1 BRN --

## (undated) DEVICE — SUT CHRMC 3-0 27IN SH BRN --

## (undated) DEVICE — GARMENT,MEDLINE,DVT,INT,CALF,MED, GEN2: Brand: MEDLINE

## (undated) DEVICE — SOLUTION IV 1000ML 0.9% SOD CHL

## (undated) DEVICE — GAUZE,SPONGE,4"X4",16PLY,STRL,LF,10/TRAY: Brand: MEDLINE

## (undated) DEVICE — BLADE ELECTRODE: Brand: VALLEYLAB

## (undated) DEVICE — PAD MATERNITY 11IN W/TAILS -- STRL

## (undated) DEVICE — DRAPE,T,LAPARO,TRANS,STERILE: Brand: MEDLINE

## (undated) DEVICE — SUTURE ABSORBABLE BRAIDED 0 CT-1 8X27 IN UD VICRYL JJ41G

## (undated) DEVICE — SURGICAL PROCEDURE PACK BASIC ST FRANCIS

## (undated) DEVICE — PAD,NON-ADHERENT,3X8,STERILE,LF,1/PK: Brand: MEDLINE

## (undated) DEVICE — REM POLYHESIVE ADULT PATIENT RETURN ELECTRODE: Brand: VALLEYLAB

## (undated) DEVICE — (D)PREP SKN CHLRAPRP APPL 26ML -- CONVERT TO ITEM 371833

## (undated) DEVICE — SPONGE LAP 18X18IN STRL -- 5/PK

## (undated) DEVICE — SUTURE VCRL SZ 1 L27IN ABSRB UD CT-1 L36MM 1/2 CIR J261H

## (undated) DEVICE — CONTAINER SPEC HISTOLOGY 900ML POLYPR

## (undated) DEVICE — TOTAL 1-LAYER TRAY, LATEX FOLEY, 16FR 10: Brand: MEDLINE